# Patient Record
Sex: MALE | Race: WHITE | NOT HISPANIC OR LATINO
[De-identification: names, ages, dates, MRNs, and addresses within clinical notes are randomized per-mention and may not be internally consistent; named-entity substitution may affect disease eponyms.]

---

## 2021-11-03 PROBLEM — Z00.00 ENCOUNTER FOR PREVENTIVE HEALTH EXAMINATION: Status: ACTIVE | Noted: 2021-11-03

## 2021-11-04 ENCOUNTER — APPOINTMENT (OUTPATIENT)
Dept: ORTHOPEDIC SURGERY | Facility: CLINIC | Age: 21
End: 2021-11-04
Payer: COMMERCIAL

## 2021-11-04 VITALS — HEIGHT: 70 IN | WEIGHT: 155 LBS | BODY MASS INDEX: 22.19 KG/M2

## 2021-11-04 DIAGNOSIS — M25.561 PAIN IN RIGHT KNEE: ICD-10-CM

## 2021-11-04 PROCEDURE — 99204 OFFICE O/P NEW MOD 45 MIN: CPT

## 2021-11-04 PROCEDURE — 73562 X-RAY EXAM OF KNEE 3: CPT | Mod: RT

## 2021-11-04 NOTE — PHYSICAL EXAM
[de-identified] : Right knee\par \par Constitutional: \par The patient is healthy-appearing and in no apparent distress. \par \par Gait:\par The patient ambulates with a normal gait and no significant limp.\par \par Cardiovascular System: \par The capillary refill is less than 2 seconds. \par \par Skin: \par There are healed suprapatellar as well as lateral knee scars.  There is swelling and skin prominence at the tibial scar with mild 3x3 mm serous drainage.  \par There is marked quadriceps atrophy.\par \par Right Knee:\par  \par Bony Palpation: \par There is no tenderness of the medial joint line. \par There is no tenderness of the lateral joint line.\par There is no tenderness of the medial femoral chondyle.\par There is no tenderness of the lateral femoral chondyle.\par There is no tenderness of the tibial tubercle.\par There is no tenderness of the superior patella.\par There is no tenderness of the inferior patella.\par There is no tenderness of the medial patellar facet.\par There is no tenderness of the lateral patellar facet.\par \par Soft Tissue Palpation: \par There is no tenderness of the medial retinaculum.\par There is no tenderness of the lateral retinaculum.\par There is no tenderness of the quadriceps tendon.\par There is no tenderness of the patella tendon.\par There is no tenderness of the ITB.\par There is no tenderness of the pes anserine.\par \par Active Range of Motion: \par The range of motion at the knee actively and passively is 0 - 115. \par \par Special Tests: \par There is a negative Apley.\par There is a negative Steinmanns. \par There is a negative Lachman and Anterior Drawer.\par There is a negative Posterior Drawer.  \par There is no varus or valgus laxity.\par \par Strength: \par There is 5/5 hip flexion and 5/5 knee flexion and extension.  \par \par Psychiatric: \par The patient demonstrates a normal mood and affect and is active and alert\par \par  [de-identified] : Given patient's reported history and physical examination, x-ray evaluation ( as listed below ) was ordered and performed to aid in diagnosis and treatment of the patient.\par X-ray right knee.  Status post ACL reconstruction with a metallic femoral cortical button and a large tibial tunnel

## 2021-11-04 NOTE — HISTORY OF PRESENT ILLNESS
[de-identified] : Patient is a new patient presenting for evaluation in regards to right knee pain as well as a draining wound.  He reports having a total of 3 ACL reconstructions with the most recent being in July of 2021.  He states his first was at age 14 and then 2 years after that he underwent a revision or a tear this year in April and had surgery in July in his home town of [place].  He reports 4 weeks after having swelling and a draining wound which time he says he had 2 arthroscopic washouts and was on a IV antibiotics for 4 weeks.  He states he's been doing rather well but has had persistent mild clear drainage from the tibial wound which she reports they live close by wound packing.  He denies any fevers denies any instability he complains of pain and stiffness on flexion.  He reports he is going to physical therapy.  Patient states that his most recent surgery he also had IT band procedure in addition to his quad tendon harvest.  He reports that the infection was MSSA. \par \par \par Prior studies: N/A\par ACL surgery 7/1/21 ([place]) - staff infection 4-5 weeks post op

## 2021-11-04 NOTE — ASSESSMENT
[FreeTextEntry1] : Lengthy discussion with the patient regarding his reported history as well as current exam.  Discussed concerned over persistent drainage from the wound and at this time I would recommend laboratory infection markers to further evaluate.  CBC with differential ESR and CRP ordered and patient aware of any elevations concerns for possible persistent/recurrent infection.  The patient or as well given the prior infection concern for graft failure and patient this time elects to proceed with continued physical therapy.  Recommend followup in 3-4 weeks we will discuss once he has labs obtained

## 2021-11-10 ENCOUNTER — TRANSCRIPTION ENCOUNTER (OUTPATIENT)
Age: 21
End: 2021-11-10

## 2021-11-10 ENCOUNTER — APPOINTMENT (OUTPATIENT)
Dept: ORTHOPEDIC SURGERY | Facility: CLINIC | Age: 21
End: 2021-11-10
Payer: COMMERCIAL

## 2021-11-10 PROCEDURE — 20610 DRAIN/INJ JOINT/BURSA W/O US: CPT | Mod: RT

## 2021-11-10 PROCEDURE — 99213 OFFICE O/P EST LOW 20 MIN: CPT | Mod: 25

## 2021-11-11 ENCOUNTER — INPATIENT (INPATIENT)
Facility: HOSPITAL | Age: 21
LOS: 11 days | Discharge: HOME | DRG: 857 | End: 2021-11-23
Attending: SPECIALIST | Admitting: SPECIALIST
Payer: COMMERCIAL

## 2021-11-11 ENCOUNTER — APPOINTMENT (OUTPATIENT)
Dept: ORTHOPEDIC SURGERY | Facility: HOSPITAL | Age: 21
End: 2021-11-11
Payer: COMMERCIAL

## 2021-11-11 ENCOUNTER — RESULT REVIEW (OUTPATIENT)
Age: 21
End: 2021-11-11

## 2021-11-11 VITALS
RESPIRATION RATE: 18 BRPM | HEIGHT: 70 IN | DIASTOLIC BLOOD PRESSURE: 84 MMHG | TEMPERATURE: 98 F | HEART RATE: 92 BPM | SYSTOLIC BLOOD PRESSURE: 139 MMHG | OXYGEN SATURATION: 99 % | WEIGHT: 160.06 LBS

## 2021-11-11 DIAGNOSIS — B95.61 METHICILLIN SUSCEPTIBLE STAPHYLOCOCCUS AUREUS INFECTION AS THE CAUSE OF DISEASES CLASSIFIED ELSEWHERE: ICD-10-CM

## 2021-11-11 DIAGNOSIS — M00.061 STAPHYLOCOCCAL ARTHRITIS, RIGHT KNEE: ICD-10-CM

## 2021-11-11 DIAGNOSIS — M00.9 PYOGENIC ARTHRITIS, UNSPECIFIED: ICD-10-CM

## 2021-11-11 DIAGNOSIS — M86.8X6 OTHER OSTEOMYELITIS, LOWER LEG: ICD-10-CM

## 2021-11-11 DIAGNOSIS — M65.9 SYNOVITIS AND TENOSYNOVITIS, UNSPECIFIED: ICD-10-CM

## 2021-11-11 DIAGNOSIS — R21 RASH AND OTHER NONSPECIFIC SKIN ERUPTION: ICD-10-CM
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
## 2021-11-11 DIAGNOSIS — M25.161: ICD-10-CM

## 2021-11-11 DIAGNOSIS — T81.43XA INFECTION FOLLOWING A PROCEDURE, ORGAN AND SPACE SURGICAL SITE, INITIAL ENCOUNTER: ICD-10-CM

## 2021-11-11 DIAGNOSIS — T84.629A INFECTION AND INFLAMMATORY REACTION DUE TO INTERNAL FIXATION DEVICE OF UNSPECIFIED BONE OF LEG, INITIAL ENCOUNTER: ICD-10-CM
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
## 2021-11-11 LAB
ANION GAP SERPL CALC-SCNC: 12 MMOL/L — SIGNIFICANT CHANGE UP (ref 5–17)
APTT BLD: 36.6 SEC — HIGH (ref 27.5–35.5)
BASOPHILS # BLD AUTO: 0.03 K/UL — SIGNIFICANT CHANGE UP (ref 0–0.2)
BASOPHILS NFR BLD AUTO: 0.4 % — SIGNIFICANT CHANGE UP (ref 0–2)
BLD GP AB SCN SERPL QL: NEGATIVE — SIGNIFICANT CHANGE UP
BUN SERPL-MCNC: 11 MG/DL — SIGNIFICANT CHANGE UP (ref 7–23)
CALCIUM SERPL-MCNC: 9.9 MG/DL — SIGNIFICANT CHANGE UP (ref 8.4–10.5)
CHLORIDE SERPL-SCNC: 102 MMOL/L — SIGNIFICANT CHANGE UP (ref 96–108)
CO2 SERPL-SCNC: 27 MMOL/L — SIGNIFICANT CHANGE UP (ref 22–31)
CREAT SERPL-MCNC: 0.82 MG/DL — SIGNIFICANT CHANGE UP (ref 0.5–1.3)
EOSINOPHIL # BLD AUTO: 0.14 K/UL — SIGNIFICANT CHANGE UP (ref 0–0.5)
EOSINOPHIL NFR BLD AUTO: 2 % — SIGNIFICANT CHANGE UP (ref 0–6)
GLUCOSE SERPL-MCNC: 98 MG/DL — SIGNIFICANT CHANGE UP (ref 70–99)
GRAM STN FLD: SIGNIFICANT CHANGE UP
HCT VFR BLD CALC: 41.1 % — SIGNIFICANT CHANGE UP (ref 39–50)
HGB BLD-MCNC: 12.9 G/DL — LOW (ref 13–17)
IMM GRANULOCYTES NFR BLD AUTO: 0.4 % — SIGNIFICANT CHANGE UP (ref 0–1.5)
INR BLD: 1.14 — SIGNIFICANT CHANGE UP (ref 0.88–1.16)
LYMPHOCYTES # BLD AUTO: 1.36 K/UL — SIGNIFICANT CHANGE UP (ref 1–3.3)
LYMPHOCYTES # BLD AUTO: 19.5 % — SIGNIFICANT CHANGE UP (ref 13–44)
MCHC RBC-ENTMCNC: 27.7 PG — SIGNIFICANT CHANGE UP (ref 27–34)
MCHC RBC-ENTMCNC: 31.4 GM/DL — LOW (ref 32–36)
MCV RBC AUTO: 88.4 FL — SIGNIFICANT CHANGE UP (ref 80–100)
MONOCYTES # BLD AUTO: 0.48 K/UL — SIGNIFICANT CHANGE UP (ref 0–0.9)
MONOCYTES NFR BLD AUTO: 6.9 % — SIGNIFICANT CHANGE UP (ref 2–14)
NEUTROPHILS # BLD AUTO: 4.92 K/UL — SIGNIFICANT CHANGE UP (ref 1.8–7.4)
NEUTROPHILS NFR BLD AUTO: 70.8 % — SIGNIFICANT CHANGE UP (ref 43–77)
NRBC # BLD: 0 /100 WBCS — SIGNIFICANT CHANGE UP (ref 0–0)
PLATELET # BLD AUTO: 314 K/UL — SIGNIFICANT CHANGE UP (ref 150–400)
POTASSIUM SERPL-MCNC: 4.5 MMOL/L — SIGNIFICANT CHANGE UP (ref 3.5–5.3)
POTASSIUM SERPL-SCNC: 4.5 MMOL/L — SIGNIFICANT CHANGE UP (ref 3.5–5.3)
PROTHROM AB SERPL-ACNC: 13.6 SEC — SIGNIFICANT CHANGE UP (ref 10.6–13.6)
RBC # BLD: 4.65 M/UL — SIGNIFICANT CHANGE UP (ref 4.2–5.8)
RBC # FLD: 12.2 % — SIGNIFICANT CHANGE UP (ref 10.3–14.5)
RH IG SCN BLD-IMP: POSITIVE — SIGNIFICANT CHANGE UP
SARS-COV-2 RNA SPEC QL NAA+PROBE: NEGATIVE — SIGNIFICANT CHANGE UP
SODIUM SERPL-SCNC: 141 MMOL/L — SIGNIFICANT CHANGE UP (ref 135–145)
SPECIMEN SOURCE: SIGNIFICANT CHANGE UP
WBC # BLD: 6.96 K/UL — SIGNIFICANT CHANGE UP (ref 3.8–10.5)
WBC # FLD AUTO: 6.96 K/UL — SIGNIFICANT CHANGE UP (ref 3.8–10.5)

## 2021-11-11 PROCEDURE — 99285 EMERGENCY DEPT VISIT HI MDM: CPT

## 2021-11-11 PROCEDURE — 99284 EMERGENCY DEPT VISIT MOD MDM: CPT

## 2021-11-11 PROCEDURE — 71046 X-RAY EXAM CHEST 2 VIEWS: CPT | Mod: 26

## 2021-11-11 PROCEDURE — 88304 TISSUE EXAM BY PATHOLOGIST: CPT | Mod: 26

## 2021-11-11 PROCEDURE — ZZZZZ: CPT

## 2021-11-11 PROCEDURE — 11044 DBRDMT BONE 1ST 20 SQ CM/<: CPT | Mod: RT,59

## 2021-11-11 PROCEDURE — 27372 REMOVAL OF FOREIGN BODY: CPT | Mod: RT,59

## 2021-11-11 PROCEDURE — 29871 ARTHRS KNEE SURG FOR INFCTJ: CPT | Mod: RT

## 2021-11-11 RX ORDER — OXYCODONE HYDROCHLORIDE 5 MG/1
5 TABLET ORAL EVERY 4 HOURS
Refills: 0 | Status: DISCONTINUED | OUTPATIENT
Start: 2021-11-11 | End: 2021-11-18

## 2021-11-11 RX ORDER — OXYCODONE HYDROCHLORIDE 5 MG/1
10 TABLET ORAL EVERY 4 HOURS
Refills: 0 | Status: DISCONTINUED | OUTPATIENT
Start: 2021-11-11 | End: 2021-11-18

## 2021-11-11 RX ORDER — OXYCODONE HYDROCHLORIDE 5 MG/1
10 TABLET ORAL
Refills: 0 | Status: DISCONTINUED | OUTPATIENT
Start: 2021-11-11 | End: 2021-11-11

## 2021-11-11 RX ORDER — ONDANSETRON 8 MG/1
4 TABLET, FILM COATED ORAL EVERY 4 HOURS
Refills: 0 | Status: DISCONTINUED | OUTPATIENT
Start: 2021-11-11 | End: 2021-11-23

## 2021-11-11 RX ORDER — SENNA PLUS 8.6 MG/1
2 TABLET ORAL AT BEDTIME
Refills: 0 | Status: DISCONTINUED | OUTPATIENT
Start: 2021-11-11 | End: 2021-11-23

## 2021-11-11 RX ORDER — SODIUM CHLORIDE 9 MG/ML
1000 INJECTION, SOLUTION INTRAVENOUS
Refills: 0 | Status: DISCONTINUED | OUTPATIENT
Start: 2021-11-11 | End: 2021-11-18

## 2021-11-11 RX ORDER — CEFAZOLIN SODIUM 1 G
2000 VIAL (EA) INJECTION EVERY 8 HOURS
Refills: 0 | Status: DISCONTINUED | OUTPATIENT
Start: 2021-11-12 | End: 2021-11-12

## 2021-11-11 RX ORDER — MAGNESIUM HYDROXIDE 400 MG/1
30 TABLET, CHEWABLE ORAL DAILY
Refills: 0 | Status: DISCONTINUED | OUTPATIENT
Start: 2021-11-11 | End: 2021-11-23

## 2021-11-11 RX ORDER — FOLIC ACID 0.8 MG
1 TABLET ORAL DAILY
Refills: 0 | Status: DISCONTINUED | OUTPATIENT
Start: 2021-11-11 | End: 2021-11-23

## 2021-11-11 RX ORDER — POLYETHYLENE GLYCOL 3350 17 G/17G
17 POWDER, FOR SOLUTION ORAL AT BEDTIME
Refills: 0 | Status: DISCONTINUED | OUTPATIENT
Start: 2021-11-11 | End: 2021-11-23

## 2021-11-11 RX ORDER — ACETAMINOPHEN 500 MG
650 TABLET ORAL EVERY 6 HOURS
Refills: 0 | Status: DISCONTINUED | OUTPATIENT
Start: 2021-11-11 | End: 2021-11-23

## 2021-11-11 RX ORDER — PANTOPRAZOLE SODIUM 20 MG/1
40 TABLET, DELAYED RELEASE ORAL
Refills: 0 | Status: DISCONTINUED | OUTPATIENT
Start: 2021-11-11 | End: 2021-11-23

## 2021-11-11 RX ORDER — ACETAMINOPHEN 500 MG
1000 TABLET ORAL ONCE
Refills: 0 | Status: COMPLETED | OUTPATIENT
Start: 2021-11-11 | End: 2021-11-11

## 2021-11-11 RX ORDER — HYDROMORPHONE HYDROCHLORIDE 2 MG/ML
0.5 INJECTION INTRAMUSCULAR; INTRAVENOUS; SUBCUTANEOUS EVERY 4 HOURS
Refills: 0 | Status: DISCONTINUED | OUTPATIENT
Start: 2021-11-11 | End: 2021-11-18

## 2021-11-11 RX ORDER — HYDROMORPHONE HYDROCHLORIDE 2 MG/ML
0.5 INJECTION INTRAMUSCULAR; INTRAVENOUS; SUBCUTANEOUS
Refills: 0 | Status: DISCONTINUED | OUTPATIENT
Start: 2021-11-11 | End: 2021-11-18

## 2021-11-11 RX ADMIN — HYDROMORPHONE HYDROCHLORIDE 0.5 MILLIGRAM(S): 2 INJECTION INTRAMUSCULAR; INTRAVENOUS; SUBCUTANEOUS at 20:04

## 2021-11-11 RX ADMIN — SODIUM CHLORIDE 110 MILLILITER(S): 9 INJECTION, SOLUTION INTRAVENOUS at 20:22

## 2021-11-11 RX ADMIN — HYDROMORPHONE HYDROCHLORIDE 0.5 MILLIGRAM(S): 2 INJECTION INTRAMUSCULAR; INTRAVENOUS; SUBCUTANEOUS at 20:17

## 2021-11-11 RX ADMIN — SODIUM CHLORIDE 110 MILLILITER(S): 9 INJECTION, SOLUTION INTRAVENOUS at 21:29

## 2021-11-11 RX ADMIN — Medication 400 MILLIGRAM(S): at 20:25

## 2021-11-11 RX ADMIN — Medication 1000 MILLIGRAM(S): at 20:52

## 2021-11-11 NOTE — ED ADULT NURSE NOTE - OBJECTIVE STATEMENT
Patient w/ Hx of injury and surgeries to right knee, c/o of increased pain, swelling and discharge from right kneeX 2 weeks, states pus like discharge,  able to bear weight and ambulate w/out any difficulty. States has night time fevers, reaches 102o, on an antibiotic PO, unable to recall name.  Last took Ibuprofen at 9am today.  Dressing in place to right knee.  States scheduled for right knee surgery.

## 2021-11-11 NOTE — ED ADULT TRIAGE NOTE - CHIEF COMPLAINT QUOTE
Patient arrives ambulatory reporting he is having right knee surgery later today and was recommended to the ED by Dr. Dasilva.  Patient reports multiple R knee surgeries since July 2021, reporting swelling/drainage/pain to the site.

## 2021-11-11 NOTE — ASSESSMENT
[FreeTextEntry1] : Lengthy discussion with the patient given current exam and concern given now gross cartilage from the tibial site as well as knee swelling.  Discussed given his prior history that I think this is a chronic issue which is now re-flaring up but I would recommend as the antibiotics which were previously ordered obtaining blood work as well as urgent MRI evaluation.  Discussed with patient if he is unable to obtain the MRI which he is going to the facility after this appointment in an expeditious fashion consideration to arthroscopic washout and possible open irrigation and debridement although the benefit of having the MRI and a chronic case would help determine the need for tibial tunnel curettage and length of involvement if any osteomyelitis.  Risks and benefits detailed with the patient and he agrees with plan.  Discussed with patient high likelihood of infection for her ACL ligament reconstruction no longer to be viable\par

## 2021-11-11 NOTE — ED PROVIDER NOTE - NS ED ROS FT
Constl: +fevers, no fatigue  HENT: no URI symptoms  Neck: no pain  CV: no chest pain or leg swelling  Resp: no shortness of breath, cough  GI: no nausea, no vomiting, no abdominal pain, no diarrhea, no constipation, no blood in stool  : no urinary frequency, no urgency, no hematuria, no dysuria  MSK: no calf pain, +L knee pain and swelling  Skin: no rash, no skin break  Neuro: no focal weakness, no numbness, no headache, no vision changes    All other systems negative except as noted in HPI.

## 2021-11-11 NOTE — HISTORY OF PRESENT ILLNESS
[de-identified] : Patient is an established patient who was seen 6 days ago her initial consultation.  Patient was contacted today prior for an update in regards to his labs and patient states he is not going to receive them as yet and states he did notice some mild increased draining what physical therapy his own discussion recommendation for an MRI given the increased drainage.  He presents today as he states he had a temperature early this morning of 101.8 and increased swelling at the knee

## 2021-11-11 NOTE — ED PROVIDER NOTE - CLINICAL SUMMARY MEDICAL DECISION MAKING FREE TEXT BOX
young male with hx septic knee joint, here with return of sytmpoms: fever and drainage from wound. exam ssupicious for septic knee with possible cutaneous fistual. ortho plan to take pt to OR urgently and later pursue imaging for osteomyelitis if unclear based on washout. young male with hx septic knee joint, here with return of sytmpoms: fever and drainage from wound. exam ssupicious for septic knee with possible cutaneous fistual. ortho plan to take pt to OR urgently and later pursue imaging for osteomyelitis if unclear based on washout. patient declined pain medications

## 2021-11-11 NOTE — H&P ADULT - NSHPLABSRESULTS_GEN_ALL_CORE
Preop labs/EKG ordered     LABS:                        12.9   6.96  )-----------( 314      ( 11 Nov 2021 14:03 )             41.1     11 Nov 2021 14:03    141    |  102    |  11     ----------------------------<  98     4.5     |  27     |  0.82     Ca    9.9        11 Nov 2021 14:03      PT/INR - ( 11 Nov 2021 14:03 )   PT: 13.6 sec;   INR: 1.14          PTT - ( 11 Nov 2021 14:03 )  PTT:36.6 sec

## 2021-11-11 NOTE — H&P ADULT - PROBLEM SELECTOR PLAN 1
Discussed with Dr. Dasilva, will admit to orthopedic service Dr. Dasilva and plan for OR today for irrigation and debridement of right knee   -Surgical consent obtained  -NPO/IVF for OR today  -preop labs/EKG  -infectious disease consult postop   -pain control  -wbat right lower extremity   -patient understands and agrees with plan

## 2021-11-11 NOTE — PROGRESS NOTE ADULT - SUBJECTIVE AND OBJECTIVE BOX
Ortho Post Op Check    Procedure: I&D of R ACL reconstruction  Surgeon: Dr. JAIR Dasilva    Subjective:  Pain controlled with medication.  Denies CP, SOB, N/V, numbness/tingling.    Objective:  Vital Signs Last 24 Hrs  T(C): 37 (11-11-21 @ 19:15), Max: 37 (11-11-21 @ 19:15)  T(F): 98.6 (11-11-21 @ 19:15), Max: 98.6 (11-11-21 @ 19:15)  HR: 74 (11-11-21 @ 20:45) (74 - 124)  BP: 126/60 (11-11-21 @ 20:45) (126/60 - 155/86)  BP(mean): 86 (11-11-21 @ 20:45) (86 - 116)  RR: 16 (11-11-21 @ 20:45) (16 - 20)  SpO2: 98% (11-11-21 @ 20:45) (97% - 100%)  AVSS    General: Pt Alert and oriented, NAD  RLE:  Dressing C/D/I  Motor: 5/5 EHL/FHL/TA/GS b/l  Sensation: SILT throughout all nerve distributions  Pulses: Toes WWP    Post-op X-Ray: s/p TKA. Components well-fixed in proper alignment.    A/P: 21yMale s/p I&D of R ACL reconstruction on 11-11.  - Stable  - Pain Control  - DVT ppx: SCDs, ASA 325mg bid  - Post op abx: Ancef 2g q8h standing  - Resume home meds  - PT, WBS: WBAT  - ID consult in AM    Ortho Pager [phone]

## 2021-11-11 NOTE — ED PROVIDER NOTE - PHYSICAL EXAMINATION
Gen: well-developed young male, nontoxic, in nad. breathing comfortably on room air  HENT: head NCAT. no conjunctival pallor, mmm  Neck: FROM, supple  CV: regular rate, no m/r/g  Resp: good air movement, bs equal and clear laura  GI: abd soft ntnd  MSK: ROM of right knee intact. has knee effusion, indurated, warmt and mildly erythematous. has wound inferior to knee/ superior aspect of tibia with surroudning granulation tissue and purulent drainge. no sig ttp.  Skin: no rash, warm extremities, normal cap refill  Neuro: alert, answering questions appropriately. cranial nerves, strength, sensation and coordination grossly intact  Psych: appropriate mood and thought content

## 2021-11-11 NOTE — H&P ADULT - NSHPPHYSICALEXAM_GEN_ALL_CORE
Gen: NAD sitting comfortably on chair in the ED  MSK: Skin warm and well perfused. DP pulse palpable right lower extremity. Has bandage overlying right knee wound - not taken down 2/2 patient going to OR today for right knee washout. Sensation intact to right lower extremity although has diminished sensation to right toes compared to left toes which pt states is his baseline after his last surgery. Firing EHL/FHL/TA/GS right lower extremity.

## 2021-11-11 NOTE — ED PROVIDER NOTE - OBJECTIVE STATEMENT
22 yo M sent to ED by orthopedist Dr Ervin for admission for washout of septic knee. [place] complicated by septic arthritis requiring 2 washouts and iv abx [place] picc [place] unsure if he has had osteomyelitis. has transferred care to [facility] when moved to [facility]. seen by dr ervin yesterday, joint aspiration attempted without any output. pt has had 2d of fever Tm 101.8, knee pain and drainage from wound at inferior aspect of knee. pain with bearing weight only 22 yo M sent to ED by orthopedist Dr Ervin for admission for washout of septic knee. pt [place] ACL [place], complicated by septic arthritis requiring 2 washouts and iv abx via picc line ([place] unsure if he has had osteomyelitis. has transferred care to [facility] when moved to [facility]. seen by dr ervin yesterday, joint aspiration attempted without any output. pt has had 2d of fever Tm 101.8, knee pain and drainage from wound at inferior aspect of knee. pain with bearing weight only. started keflex yesterday

## 2021-11-11 NOTE — PROCEDURE
[de-identified] : Patient has demonstrated limited relief from NSAIDS, rest, exercises / PT, and after discussion of the risks and benefits, the patient has elected to proceed with an aspiration of the RIGHT knee via an Superolateral site.\par Confirmed that the patient does not have history of prior adverse reactions, active, infections, or relevant allergies.   There was no erythema or warmth, and the skin was clear.  The skin was sterilized with alcohol and via sterile technique, only 3 cc of sanginous fluid was removed.  The aspiration was completed without complication and a bandage was applied.  The patient tolerated the procedure well and was given post-injection instructions.

## 2021-11-11 NOTE — ED ADULT NURSE NOTE - NSIMPLEMENTINTERV_GEN_ALL_ED
Implemented All Universal Safety Interventions:  [place] to call system. Call bell, personal items and telephone within reach. Instruct patient to call for assistance. Room bathroom lighting operational. Non-slip footwear when patient is off stretcher. Physically safe environment: no spills, clutter or unnecessary equipment. Stretcher in lowest position, wheels locked, appropriate side rails in place.

## 2021-11-11 NOTE — ED ADULT NURSE REASSESSMENT NOTE - NS ED NURSE REASSESS COMMENT FT1
Patient a/oX3, c/o of right knee pain, redness, swelling and pus discharge, no fever.  NSR on EKG, vital signs stable.  Left AC PIV #20 in place, all labs sent, no complications.  For surgery/Ortho admit.  NPO observed. Endorsement report and care received by TERRY Barton / ED holding pending OR.

## 2021-11-11 NOTE — PHYSICAL EXAM
[de-identified] : Right knee\par \par Constitutional: \par The patient is healthy-appearing and in no apparent distress. \par \par Gait:\par The patient ambulates with a normal gait and no significant limp.\par \par Cardiovascular System: \par The capillary refill is less than 2 seconds. \par \par Skin: \par There are healed suprapatellar as well as lateral knee scars.  There is swelling and skin prominence at the tibial scar with grossly expressible puruluence.  There is a moderate knee effusion but no erythema.  \par There is marked quadriceps atrophy.\par \par Right Knee:\par  \par Bony Palpation: \par There is no tenderness of the medial joint line. \par There is no tenderness of the lateral joint line.\par There is no tenderness of the medial femoral chondyle.\par There is no tenderness of the lateral femoral chondyle.\par There is no tenderness of the tibial tubercle.\par There is no tenderness of the superior patella.\par There is no tenderness of the inferior patella.\par There is no tenderness of the medial patellar facet.\par There is no tenderness of the lateral patellar facet.\par \par Soft Tissue Palpation: \par There is no tenderness of the medial retinaculum.\par There is no tenderness of the lateral retinaculum.\par There is no tenderness of the quadriceps tendon.\par There is no tenderness of the patella tendon.\par There is no tenderness of the ITB.\par There is no tenderness of the pes anserine.\par \par Active Range of Motion: \par The range of motion at the knee actively and passively is 0 - 100. \par \par Psychiatric: \par The patient demonstrates a normal mood and affect and is active and alert\par \par

## 2021-11-11 NOTE — H&P ADULT - HISTORY OF PRESENT ILLNESS
The patient is a 21 year old male s/p right ACL reconstruction in 2014 with subsequent revisions and recent infection s/p right knee washout in july 2021 sent to the ED by Dr. Dasilva with concern for continued infection. The patient states he had a right knee washout in july 2021 and was subsequently on IV antibiotics for 4 weeks. He states that he recently noticed some drainage from his right knee wound which worsened over the last 2 days. He saw Dr. Dasilva in his outpatient office who advised he come to the ED for further evaluation. Took a dose of PO keflex yesterday as prescribed by Dr. Dasilva.

## 2021-11-12 ENCOUNTER — TRANSCRIPTION ENCOUNTER (OUTPATIENT)
Age: 21
End: 2021-11-12

## 2021-11-12 LAB
ANION GAP SERPL CALC-SCNC: 11 MMOL/L — SIGNIFICANT CHANGE UP (ref 5–17)
BASOPHILS # BLD AUTO: 0.01 K/UL — SIGNIFICANT CHANGE UP (ref 0–0.2)
BASOPHILS NFR BLD AUTO: 0.2 % — SIGNIFICANT CHANGE UP (ref 0–2)
BUN SERPL-MCNC: 12 MG/DL — SIGNIFICANT CHANGE UP (ref 7–23)
CALCIUM SERPL-MCNC: 9.2 MG/DL — SIGNIFICANT CHANGE UP (ref 8.4–10.5)
CHLORIDE SERPL-SCNC: 100 MMOL/L — SIGNIFICANT CHANGE UP (ref 96–108)
CO2 SERPL-SCNC: 27 MMOL/L — SIGNIFICANT CHANGE UP (ref 22–31)
COVID-19 SPIKE DOMAIN AB INTERP: POSITIVE
COVID-19 SPIKE DOMAIN ANTIBODY RESULT: >250 U/ML — HIGH
CREAT SERPL-MCNC: 0.79 MG/DL — SIGNIFICANT CHANGE UP (ref 0.5–1.3)
EOSINOPHIL # BLD AUTO: 0 K/UL — SIGNIFICANT CHANGE UP (ref 0–0.5)
EOSINOPHIL NFR BLD AUTO: 0 % — SIGNIFICANT CHANGE UP (ref 0–6)
GLUCOSE SERPL-MCNC: 154 MG/DL — HIGH (ref 70–99)
HCT VFR BLD CALC: 35.1 % — LOW (ref 39–50)
HGB BLD-MCNC: 11.2 G/DL — LOW (ref 13–17)
IMM GRANULOCYTES NFR BLD AUTO: 0.3 % — SIGNIFICANT CHANGE UP (ref 0–1.5)
LYMPHOCYTES # BLD AUTO: 0.65 K/UL — LOW (ref 1–3.3)
LYMPHOCYTES # BLD AUTO: 11.2 % — LOW (ref 13–44)
MCHC RBC-ENTMCNC: 28.1 PG — SIGNIFICANT CHANGE UP (ref 27–34)
MCHC RBC-ENTMCNC: 31.9 GM/DL — LOW (ref 32–36)
MCV RBC AUTO: 88.2 FL — SIGNIFICANT CHANGE UP (ref 80–100)
MONOCYTES # BLD AUTO: 0.29 K/UL — SIGNIFICANT CHANGE UP (ref 0–0.9)
MONOCYTES NFR BLD AUTO: 5 % — SIGNIFICANT CHANGE UP (ref 2–14)
NEUTROPHILS # BLD AUTO: 4.82 K/UL — SIGNIFICANT CHANGE UP (ref 1.8–7.4)
NEUTROPHILS NFR BLD AUTO: 83.3 % — HIGH (ref 43–77)
NIGHT BLUE STAIN TISS: SIGNIFICANT CHANGE UP
NRBC # BLD: 0 /100 WBCS — SIGNIFICANT CHANGE UP (ref 0–0)
PLATELET # BLD AUTO: 331 K/UL — SIGNIFICANT CHANGE UP (ref 150–400)
POTASSIUM SERPL-MCNC: 4.5 MMOL/L — SIGNIFICANT CHANGE UP (ref 3.5–5.3)
POTASSIUM SERPL-SCNC: 4.5 MMOL/L — SIGNIFICANT CHANGE UP (ref 3.5–5.3)
RBC # BLD: 3.98 M/UL — LOW (ref 4.2–5.8)
RBC # FLD: 11.9 % — SIGNIFICANT CHANGE UP (ref 10.3–14.5)
SARS-COV-2 IGG+IGM SERPL QL IA: >250 U/ML — HIGH
SARS-COV-2 IGG+IGM SERPL QL IA: POSITIVE
SODIUM SERPL-SCNC: 138 MMOL/L — SIGNIFICANT CHANGE UP (ref 135–145)
SPECIMEN SOURCE: SIGNIFICANT CHANGE UP
WBC # BLD: 5.79 K/UL — SIGNIFICANT CHANGE UP (ref 3.8–10.5)
WBC # FLD AUTO: 5.79 K/UL — SIGNIFICANT CHANGE UP (ref 3.8–10.5)

## 2021-11-12 PROCEDURE — 99222 1ST HOSP IP/OBS MODERATE 55: CPT

## 2021-11-12 RX ORDER — ASPIRIN/CALCIUM CARB/MAGNESIUM 324 MG
325 TABLET ORAL
Refills: 0 | Status: DISCONTINUED | OUTPATIENT
Start: 2021-11-12 | End: 2021-11-14

## 2021-11-12 RX ORDER — VANCOMYCIN HCL 1 G
1000 VIAL (EA) INTRAVENOUS EVERY 8 HOURS
Refills: 0 | Status: DISCONTINUED | OUTPATIENT
Start: 2021-11-12 | End: 2021-11-13

## 2021-11-12 RX ORDER — PIPERACILLIN AND TAZOBACTAM 4; .5 G/20ML; G/20ML
4.5 INJECTION, POWDER, LYOPHILIZED, FOR SOLUTION INTRAVENOUS EVERY 6 HOURS
Refills: 0 | Status: DISCONTINUED | OUTPATIENT
Start: 2021-11-12 | End: 2021-11-12

## 2021-11-12 RX ADMIN — Medication 325 MILLIGRAM(S): at 17:22

## 2021-11-12 RX ADMIN — Medication 1 MILLIGRAM(S): at 11:01

## 2021-11-12 RX ADMIN — SENNA PLUS 2 TABLET(S): 8.6 TABLET ORAL at 22:32

## 2021-11-12 RX ADMIN — Medication 650 MILLIGRAM(S): at 02:31

## 2021-11-12 RX ADMIN — Medication 1 TABLET(S): at 11:02

## 2021-11-12 RX ADMIN — POLYETHYLENE GLYCOL 3350 17 GRAM(S): 17 POWDER, FOR SOLUTION ORAL at 22:33

## 2021-11-12 RX ADMIN — Medication 650 MILLIGRAM(S): at 09:24

## 2021-11-12 RX ADMIN — PIPERACILLIN AND TAZOBACTAM 200 GRAM(S): 4; .5 INJECTION, POWDER, LYOPHILIZED, FOR SOLUTION INTRAVENOUS at 11:02

## 2021-11-12 RX ADMIN — SODIUM CHLORIDE 110 MILLILITER(S): 9 INJECTION, SOLUTION INTRAVENOUS at 19:15

## 2021-11-12 RX ADMIN — OXYCODONE HYDROCHLORIDE 5 MILLIGRAM(S): 5 TABLET ORAL at 19:15

## 2021-11-12 RX ADMIN — Medication 650 MILLIGRAM(S): at 09:54

## 2021-11-12 RX ADMIN — Medication 650 MILLIGRAM(S): at 18:44

## 2021-11-12 RX ADMIN — Medication 100 MILLIGRAM(S): at 02:24

## 2021-11-12 RX ADMIN — Medication 250 MILLIGRAM(S): at 17:22

## 2021-11-12 RX ADMIN — OXYCODONE HYDROCHLORIDE 5 MILLIGRAM(S): 5 TABLET ORAL at 19:45

## 2021-11-12 RX ADMIN — Medication 650 MILLIGRAM(S): at 18:29

## 2021-11-12 RX ADMIN — Medication 250 MILLIGRAM(S): at 09:24

## 2021-11-12 NOTE — DISCHARGE NOTE PROVIDER - HOSPITAL COURSE
Admitted: 11/11/21  Surgery: 11/11/21; I&D right knee ACL  Linda-op Antibiotics: Vancy and Zosyn  Pain control  DVT prophylaxis: Aspirin 325mg BID  OOB/Physical Therapy  Infectious disease consulted  Plastic surgery consulted   Admitted: 11/11/21  Surgery: 11/11/21 I&D right knee, 11/15/21 repeat I&D right knee  IV antibiotics  Pain control  DVT prophylaxis  OOB/Physical Therapy  Infectious disease consulted  Plastic surgery consulted   Admitted: 11/11/21  Surgery: 11/11/21 I&D right knee,   11/15/21 repeat I&D right knee  11/18/21: repeat I&D right knee with gastrocnemius muscle flap and skin graft closure   IV antibiotics  Pain control  DVT prophylaxis  OOB/Physical Therapy  Infectious disease consulted  Plastic surgery consulted   Admitted: 11/11/21  Surgery: 11/11/21 I&D right knee,   11/15/21 repeat I&D right knee  11/18/21: repeat I&D right knee with gastrocnemius muscle flap and skin graft closure   IV antibiotics-   Pain control  DVT prophylaxis  OOB/Physical Therapy  Infectious disease consulted  Plastic surgery consulted

## 2021-11-12 NOTE — PROGRESS NOTE ADULT - SUBJECTIVE AND OBJECTIVE BOX
Orthopaedic Surgery Progress Note    Post-operative day #1 s/p right knee irrigation and debridement     Subjective:     Patient seen and examined. Patient comfortable without complaints, pain controlled.      Objective:    Vital Signs Last 24 Hrs  T(C): 36.8 (11-12-21 @ 08:43), Max: 36.8 (11-12-21 @ 08:43)  T(F): 98.2 (11-12-21 @ 08:43), Max: 98.2 (11-12-21 @ 08:43)  HR: 91 (11-12-21 @ 08:43) (91 - 91)  BP: 112/69 (11-12-21 @ 08:43) (112/69 - 112/69)  BP(mean): --  RR: 17 (11-12-21 @ 08:43) (17 - 17)  SpO2: 98% (11-12-21 @ 08:43) (98% - 98%)  AVSS    PE:  General: Patient alert and oriented, NAD  Dressing: Clean/dry/intact right knee ace wrap  sensation intact to right lower extremity   Motor: EHL/FHL/TA/GS firing right lower extremity                           11.2   5.79  )-----------( 331      ( 12 Nov 2021 06:49 )             35.1   11-12    138  |  100  |  12  ----------------------------<  154<H>  4.5   |  27  |  0.79    Ca    9.2      12 Nov 2021 06:49        A/P: 21yMale POD#1 s/p right knee irrigation and debridement   1. Pain control as needed  2. DVT prophylaxis: ASA  3. PT, weight-bearing status: WBAT   4. ID consulted - per Dr. Herminio littlejohn/jeremiah cantu, will appreciate further ID recommendations  5. Per Dr. Dasilva, MRI right knee w/ extension to proximal tibial shaft w/ and w/o IV contrast ordered to eval for osteomyelitis   6. plastic surgeon Dr. Ceballos to see patient today     Ortho Pager [phone]

## 2021-11-12 NOTE — DIETITIAN INITIAL EVALUATION ADULT. - FACTORS AFF FOOD INTAKE
· Patient came to the hospital with shortness of breath that has been going on for the past 3 4 days  Patient with chronic bilateral lower extremity swelling which is slightly worse  · Currently patient requiring oxygen which is unusual for him  · Chest x-ray reviewed  Patient do not have any fever  Cough with expectoration present  Unlikely this is pneumonia given the duration of symptoms     No significant leukocytosis noted  · COVID negative  · Will obtain a CT scan to evaluate none

## 2021-11-12 NOTE — DIETITIAN INITIAL EVALUATION ADULT. - ORAL INTAKE PTA/DIET HISTORY
Spoke with pt in room this morning. States appetite over the summer was poor and reports ~30# wt loss, however, over last 2-3 months appetite has returned to "normal." Eating 3 meals + snacks per day. No cultural, ethnic, Sikhism food preferences noted, NKFA

## 2021-11-12 NOTE — DISCHARGE NOTE PROVIDER - PROVIDER TOKENS
PROVIDER:[TOKEN:[[id number]],FOLLOWUP:[2 weeks]],PROVIDER:[TOKEN:[[id number]],FOLLOWUP:[2 weeks]]

## 2021-11-12 NOTE — PHYSICAL THERAPY INITIAL EVALUATION ADULT - ADDITIONAL COMMENTS
Pt lives with friends in an apartment, no BRISEIDA + 2FOS inside.  Pt owns AC.  Pt denies recent Assistive Device use prior to I&D.  Pt denies recent history of falls.

## 2021-11-12 NOTE — DIETITIAN INITIAL EVALUATION ADULT. - OTHER INFO
Pt 22 y/o male s/p rt ACL reconstruction in 2014 with subsequent revisions and recent infection s/p rt knee washout in July 2021 and on IV abx x4 weeks. Recently noticed drainage from rt knee which worsened over the last 2 days. Now s/p I&D of rt knee joint, removal of foreign body today (11/12). Discussed with pt importance of nutrition for healing and recovery. Is agreeable to tray Ensure Max and LPS daily. Spoke with ortho team and ordered supplements.     GI: abd-soft, + normal bowel sounds  Skin: surgical incision noted, no pressure breakdown   Pain: Denies     Pt states UBW early this year was 170# (77.3kg) and reports ~30# (13.6kg) wt decline over the summer. However, states weight stabilized over last 2-3 months around 155-160# (70.5-72.7kg). Per EMR, no wt hx. Per pt's reported UBW and current admission weight, no significant wt loss is noted. Difficult to determine actual weight decline.

## 2021-11-12 NOTE — DISCHARGE NOTE PROVIDER - NSDCFUADDINST_GEN_ALL_CORE_FT
You have been seen by Infectious Disease during your admission. It is very important that you follow-up with Dr. Durham following discharge. Please call the number listed above.     ACTIVITY:  - Weightbear as tolerated with assistive device, if needed. No strenuous activity, heavy lifting, driving or returning to work until cleared by MD.  - You may experience postoperative swelling on the operative extremity. You may ice the surgery site for 20 minute intervals and elevate the operative extremity at the level of the ankle to help reduce swelling.    DRESSING: ***  - You may shower, your dressing is water-resistant. Do not soak in bathtubs. Remove dressing after postop day 5-7, then leave incision open to air.  - Keep your incision clean and dry. Do not pick at your incision. Do not apply creams, ointments or oils to your incision until cleared by your surgeon. Do not soak your incision in sitting water (ie tubs, pools, lakes, etc.) until cleared by your surgeon. You may let clean, running water fall over your incision.    MEDICATION/ANTICOAGULATION:  -You have been prescribed Aspirin 325 mg, twice daily, as a preventative to help prevent postoperative blood clots. Please take this medication as prescribed.   - You have been prescribed medications for pain:    - Tylenol for mild to moderate pain. If you have been prescribed 650mg, you may take this medication every 6 hours. If you have been prescribed 975mg or 1,000mg, you may take this every 8 hours.     - For more severe pain, you may continue to take the Tylenol with the addition of narcotic pain medication. You may take this medication every 4 hours. Please take this medication as prescribed. Do not take more than prescribed. Note that this medication may cause drowsiness or dizziness. Do not operate machinery. This medication may cause constipation.  - If you have had a joint replacement and do not have a history of kidney disease or stomach bleed/ulcer, you may take Celebrex, Naproxen, or Ibuprofen to help with postoperative pain and inflammation. These medications are considered anti-inflammatories. Please do not combine anti-inflammatories.   -Try to have regular bowel movements. Take stool softener or laxative if necessary. You may wish to take Miralax daily until you have regular bowel movements.   -If you have been prescribed Aspirin or an anti-inflammatory, please take Prilosec once a day, before breakfast, until no longer taking Aspirin or anti-inflammatory. This will help protect your stomach lining.  - If you have a pain management physician, please follow-up with them postoperatively.   - If you experience any negative side effects of your medications, please call your surgeon's office to discuss.    Follow-up:  - Call to schedule an appt with Dr. Dasilva for follow up. If you have staples or sutures they will be removed in office.  - Please follow-up with your primary care physician or any other specialist you see postoperatively, if needed.     - Contact your doctor if you experience: fever greater than 101.5, chills, chest pain, difficulty breathing, redness or excessive drainage around the incision, other concerns.   You have been seen by Infectious Disease during your admission. It is very important that you follow-up with Dr. Durham following discharge. Please call the number listed above.   IV antibiotic via PICC:     ACTIVITY:  - Weight bear as tolerated with assistive device, if needed. No strenuous activity, heavy lifting, driving or returning to work until cleared by MD.  - You may experience postoperative swelling on the operative extremity. You may ice the surgery site for 20 minute intervals and elevate the operative extremity at the level of the ankle to help reduce swelling.    DRESSING: ***  - You may shower, your dressing is water-resistant. Do not soak in bathtubs. Remove dressing after postop day 5-7, then leave incision open to air.  - Keep your incision clean and dry. Do not pick at your incision. Do not apply creams, ointments or oils to your incision until cleared by your surgeon. Do not soak your incision in sitting water (ie tubs, pools, lakes, etc.) until cleared by your surgeon. You may let clean, running water fall over your incision.    MEDICATION/ANTICOAGULATION:  -You have been prescribed Aspirin 325 mg, twice daily, as a preventative to help prevent postoperative blood clots. Please take this medication as prescribed.   - You have been prescribed medications for pain:    - Tylenol for mild to moderate pain. If you have been prescribed 650mg, you may take this medication every 6 hours. If you have been prescribed 975mg or 1,000mg, you may take this every 8 hours.     - For more severe pain, you may continue to take the Tylenol with the addition of narcotic pain medication. You may take this medication every 4 hours. Please take this medication as prescribed. Do not take more than prescribed. Note that this medication may cause drowsiness or dizziness. Do not operate machinery. This medication may cause constipation.  -Try to have regular bowel movements. Take stool softener or laxative if necessary. You may wish to take Miralax daily until you have regular bowel movements.   -If you have been prescribed Aspirin or an anti-inflammatory, please take Prilosec once a day, before breakfast, until no longer taking Aspirin or anti-inflammatory. This will help protect your stomach lining.  - If you have a pain management physician, please follow-up with them postoperatively.   - If you experience any negative side effects of your medications, please call your surgeon's office to discuss.    Follow-up:  - Call to schedule an appt with Dr. Dasilav for follow up. If you have staples or sutures they will be removed in office.  - Please follow-up with your primary care physician or any other specialist you see postoperatively, if needed.     - Contact your doctor if you experience: fever greater than 101.5, chills, chest pain, difficulty breathing, redness or excessive drainage around the incision, other concerns.   You have been seen by Infectious Disease during your admission. It is very important that you follow-up with Dr. Durham following discharge. Please call the number listed above.   IV antibiotic via PICC:     ACTIVITY:  - Nonweight bearing to RLE in knee immobilizer for next 2 weeks. No strenuous activity, heavy lifting, driving or returning to work until cleared by MD.  - You may experience postoperative swelling on the operative extremity. You may ice the surgery site for 20 minute intervals and elevate the operative extremity at the level of the ankle to help reduce swelling.    DRESSING:   - Do not shower until cleared by Dr. Ceballos. You may sponge bathe.  Do not soak in bathtubs. Twice daily dressing changes with bacitracin, xeroform, gauze and tegaderm.   - Keep your incision clean and dry. Do not pick at your incision. Do not apply creams, ointments or oils to your incision until cleared by your surgeon. Do not soak your incision in sitting water (ie tubs, pools, lakes, etc.) until cleared by your surgeon.   - Your drain will remain in place until you see Dr. Ceballos in the office. Your VAC has been removed and replaced with above dressing. Continue knee immobilizer.   - Please call Dr. Ceballos for any flap/dressing concerns.   MEDICATION/ANTICOAGULATION:  -You have been prescribed Lovenox injections daily for 14 days as a preventative to help prevent postoperative blood clots. Please take this medication as prescribed.   - You have been prescribed medications for pain:    - Tylenol for mild to moderate pain. If you have been prescribed 650mg, you may take this medication every 6 hours. If you have been prescribed 975mg or 1,000mg, you may take this every 8 hours.     - For more severe pain, you may continue to take the Tylenol with the addition of narcotic pain medication. You may take this medication every 4 hours. Please take this medication as prescribed. Do not take more than prescribed. Note that this medication may cause drowsiness or dizziness. Do not operate machinery. This medication may cause constipation.  -Try to have regular bowel movements. Take stool softener or laxative if necessary. You may wish to take Miralax daily until you have regular bowel movements.   -If you have been prescribed an anti-inflammatory, please take Prilosec once a day, before breakfast, until no longer taking Aspirin or anti-inflammatory. This will help protect your stomach lining.  - If you have a pain management physician, please follow-up with them postoperatively.   - If you experience any negative side effects of your medications, please call your surgeon's office to discuss.    Follow-up:  - Call Dr. Ceballos to schedule appt for next week for dressing/flap check and for drain removal.   - Call to schedule an appt with Dr. Dasilva for follow up. If you have staples or sutures they will be removed in office.  - Please follow up with ID   - Please follow-up with your primary care physician or any other specialist you see postoperatively, if needed.     - Contact your doctor if you experience: fever greater than 101.5, chills, chest pain, difficulty breathing, redness or excessive drainage around the incision, other concerns.   You have been seen by Infectious Disease during your admission. It is very important that you follow-up with Dr. Durham following discharge.   Please call the number listed above.   IV antibiotic via PICC: cefazolin (ancef) 2g q8 hours until 12/29/21    ACTIVITY:  - Nonweight bearing to RLE in knee immobilizer for next 2 weeks. No strenuous activity, heavy lifting, driving or returning to work until cleared by MD.  - You may experience postoperative swelling on the operative extremity. You may ice the surgery site for 20 minute intervals and elevate the operative extremity at the level of the ankle to help reduce swelling.    DRESSING:   - Do not shower until cleared by Dr. Ceballos. You may sponge bathe.  Do not soak in bathtubs.   Twice daily dressing changes with bacitracin, xeroform, gauze and tegaderm.   - Keep your incision clean and dry. Do not pick at your incision. Do not apply creams, ointments or oils to your incision until cleared by your surgeon. Do not soak your incision in sitting water (ie tubs, pools, lakes, etc.) until cleared by your surgeon.   - Your drain will remain in place until you see Dr. Ceballos in the office. Your VAC has been removed and replaced with above dressing. Continue knee immobilizer.   - Please call Dr. Ceballos for any flap/dressing concerns.     MEDICATION/ANTICOAGULATION:  -You have been prescribed Lovenox injections daily for 14 days as a preventative to help prevent postoperative blood clots. Please take this medication as prescribed.   - You have been prescribed medications for pain:    - Tylenol for mild to moderate pain. If you have been prescribed 650mg, you may take this medication every 6 hours. If you have been prescribed 975mg or 1,000mg, you may take this every 8 hours.     - For more severe pain, you may continue to take the Tylenol with the addition of narcotic pain medication. You may take this medication every 4-6 hours. Please take this medication as prescribed. Do not take more than prescribed. Note that this medication may cause drowsiness or dizziness. Do not operate machinery. This medication may cause constipation.  -Try to have regular bowel movements. Take stool softener or laxative if necessary. You may wish to take Miralax daily until you have regular bowel movements.   - If you have a pain management physician, please follow-up with them postoperatively.   - If you experience any negative side effects of your medications, please call your surgeon's office to discuss.    Follow-up:  - Call Dr. Ceballos to schedule appt for next week for dressing/flap check and for drain removal. - planned for Monday 11/29 or after   - Call to schedule an appt with Dr. Dasilva for follow up. If you have staples or sutures they will be removed in office.  - Please follow up with ID: Follow up with Dr. Durham within 2 weeks of Discharge. ([address] [place] [address], [phone]), ID office will call you to schedule   - Please follow-up with your primary care physician or any other specialist you see postoperatively, if needed.     - Contact your doctor if you experience: fever greater than 101.5, chills, chest pain, difficulty breathing, redness or excessive drainage around the incision, other concerns.

## 2021-11-12 NOTE — DISCHARGE NOTE PROVIDER - NSDCMRMEDTOKEN_GEN_ALL_CORE_FT
ceFAZolin: 2 gram(s) intravenous every 8 hours for 6 weeks till 12/ /21           MDD:2g  senna oral tablet: 2 tab(s) orally once a day (at bedtime)   CBC, CMP, ESR, CRP lab draw: 1x/week for 6 weeks from 11/18-12/29/21  Please fax results to Dr. Durham [phone]  ceFAZolin: 2 gram(s) intravenous every 8 hours  from 11/18-12/29 for 6 weeks MDD:2  HEPARIN 3ML FROM 5ML SYRINGE: ADMINISTER AFTER EACH INFUSION  NS FLUSH 10ML: ADMINISTER AFTER EACH INFUSION  senna oral tablet: 2 tab(s) orally once a day (at bedtime)   CBC, CMP, ESR, CRP lab draw: 1x/week for 6 weeks from 11/18-12/29/21  Please fax results to Dr. Durham [phone]  ceFAZolin: 2 gram(s) intravenous every 8 hours  from 11/18-12/29 for 6 weeks MDD:2  enoxaparin 40 mg/0.4 mL injectable solution: 40 milligram(s) subcutaneously once a day for 14 days for DVT prophylaxis    HEPARIN 3ML FROM 5ML SYRINGE: ADMINISTER AFTER EACH INFUSION  NS FLUSH 10ML: ADMINISTER AFTER EACH INFUSION  senna oral tablet: 2 tab(s) orally once a day (at bedtime)   acetaminophen 325 mg oral tablet: 2 tab(s) orally every 6 hours, As needed, Temp greater or equal to 38C (100.4F), Mild Pain (1 - 3)  bacitracin 500 units/g topical ointment: Apply topically to wound once a day   CBC, CMP, ESR, CRP lab draw: 1x/week for 6 weeks from 11/18-12/29/21  Please fax results to Dr. Durham [phone]  ceFAZolin: 2 gram(s) intravenous every 8 hours  from 11/18-12/29 for 6 weeks MDD:2  enoxaparin 40 mg/0.4 mL injectable solution: 40 milligram(s) subcutaneously once a day for 14 days for DVT prophylaxis    HEPARIN 3ML FROM 5ML SYRINGE: ADMINISTER AFTER EACH INFUSION  NS FLUSH 10ML: ADMINISTER AFTER EACH INFUSION  oxyCODONE 5 mg oral tablet: 1-2 tab(s) orally every 6 hours, As Needed moderate to severe pain MDD:4  senna oral tablet: 2 tab(s) orally once a day (at bedtime)

## 2021-11-12 NOTE — CONSULT NOTE ADULT - SUBJECTIVE AND OBJECTIVE BOX
INFECTIOUS DISEASES INITIAL CONSULT NOTE    HPI: 21M h/o R ACL reconstruction in 2014, revision in 2016 and 7/1/21 c/b MSSA infection s/p I&D x 2 in 7/2021 s/p cefazolin x 4 weeks (ended in mid 9/2021) p/w recurrent R knee infection.  He had all three surgeries at [facility].  After the 3rd surgery on 7/1/21, he developed R knee swelling/erythema and was found to have MSSA infection.  He underwent I&D twice with dissolvable screw placeent  and received cefazolin 4 weeks.   His wound never healed.  In the past 2 days he noticed erythema and pus drainage from the wound so he was seen by Dr. Dasilva (ortho), who prescribed Keflex.  He was then instructed to go to ED for washout.  Patient underwent I&D, screw removal and arthroscopy.  He was started on IV cefazolin.  ID was consulted for abx rec.           PAST MEDICAL & SURGICAL HISTORY:  per HPI      Review of Systems:   Constitutional, eyes, ENT, cardiovascular, respiratory, gastrointestinal, genitourinary, integumentary, neurological, psychiatric and heme/lymph are otherwise negative other than noted above       ANTIBIOTICS:  MEDICATIONS  (STANDING):  aspirin enteric coated 325 milliGRAM(s) Oral two times a day  folic acid 1 milliGRAM(s) Oral daily  lactated ringers. 1000 milliLiter(s) (110 mL/Hr) IV Continuous <Continuous>  multivitamin 1 Tablet(s) Oral daily  pantoprazole    Tablet 40 milliGRAM(s) Oral before breakfast  polyethylene glycol 3350 17 Gram(s) Oral at bedtime  senna 2 Tablet(s) Oral at bedtime  vancomycin  IVPB 1000 milliGRAM(s) IV Intermittent every 8 hours    MEDICATIONS  (PRN):  acetaminophen     Tablet .. 650 milliGRAM(s) Oral every 6 hours PRN Temp greater or equal to 38C (100.4F), Mild Pain (1 - 3)  bisacodyl Suppository 10 milliGRAM(s) Rectal once PRN Constipation  HYDROmorphone  Injectable 0.5 milliGRAM(s) IV Push every 15 minutes PRN pacu  HYDROmorphone  Injectable 0.5 milliGRAM(s) IV Push every 4 hours PRN breakthrough  magnesium hydroxide Suspension 30 milliLiter(s) Oral daily PRN Constipation  ondansetron Injectable 4 milliGRAM(s) IV Push every 4 hours PRN Nausea and/or Vomiting  oxyCODONE    IR 10 milliGRAM(s) Oral every 4 hours PRN Severe Pain (7 - 10)  oxyCODONE    IR 5 milliGRAM(s) Oral every 4 hours PRN Moderate Pain (4 - 6)      Allergies    No Known Allergies    Intolerances        SOCIAL HISTORY:  Denied smoking/illicit.  Social EtOH.  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.  Family lives in [place] [facility]     FAMILY HISTORY:   no FH leading to current infection    Vital Signs Last 24 Hrs  T(C): 37 (12 Nov 2021 16:58), Max: 37 (12 Nov 2021 16:58)  T(F): 98.6 (12 Nov 2021 16:58), Max: 98.6 (12 Nov 2021 16:58)  HR: 90 (12 Nov 2021 16:58) (71 - 96)  BP: 152/61 (12 Nov 2021 16:58) (102/59 - 152/61)  BP(mean): 77 (12 Nov 2021 04:38) (77 - 77)  RR: 17 (12 Nov 2021 16:58) (16 - 17)  SpO2: 97% (12 Nov 2021 16:58) (95% - 98%)    11-11-21 @ 07:01  -  11-12-21 @ 07:00  --------------------------------------------------------  IN: 1550 mL / OUT: 600 mL / NET: 950 mL    11-12-21 @ 07:01  -  11-12-21 @ 20:56  --------------------------------------------------------  IN: 3005 mL / OUT: 2000 mL / NET: 1005 mL        PHYSICAL EXAM:  Constitutional: alert, NAD  Eyes: the sclera and conjunctiva were normal.   ENT: the ears and nose were normal in appearance.   Neck: the appearance of the neck was normal and the neck was supple.   Pulmonary: no respiratory distress and lungs were clear to auscultation bilaterally.   Heart: heart rate was normal and rhythm regular, normal S1 and S2  Ext: R knee wrapped with ACE, photo reviewed - large open wound   Abdomen: normal bowel sounds, soft, non-tender  Neurological: no focal deficits.   Psychiatric: the affect was normal      LABS:                        11.2   5.79  )-----------( 331      ( 12 Nov 2021 06:49 )             35.1     11-12    138  |  100  |  12  ----------------------------<  154<H>  4.5   |  27  |  0.79    Ca    9.2      12 Nov 2021 06:49      PT/INR - ( 11 Nov 2021 14:03 )   PT: 13.6 sec;   INR: 1.14          PTT - ( 11 Nov 2021 14:03 )  PTT:36.6 sec      MICROBIOLOGY:  11/12 AFB R tibia: p  11/12 Fungal R tibia: p  11/11 R tibia WCx: staph aureus      RADIOLOGY & ADDITIONAL STUDIES:

## 2021-11-12 NOTE — DISCHARGE NOTE PROVIDER - NSDCACTIVITY_GEN_ALL_CORE
Walking - Indoors allowed/No heavy lifting/straining/Follow Instructions Provided by your Surgical Team Do not drive or operate machinery/Stairs allowed/Walking - Indoors allowed/No heavy lifting/straining/Walking - Outdoors allowed/Follow Instructions Provided by your Surgical Team

## 2021-11-12 NOTE — PHYSICAL THERAPY INITIAL EVALUATION ADULT - PERTINENT HX OF CURRENT PROBLEM, REHAB EVAL
The patient is a 21 year old male s/p right ACL reconstruction in 2014 with subsequent revisions and recent infection s/p right knee washout in july 2021 sent to the ED by Dr. Dasilva with concern for continued infection. The patient states he had a right knee washout in july 2021 and was subsequently on IV antibiotics for 4 weeks.

## 2021-11-12 NOTE — DISCHARGE NOTE PROVIDER - NSDCCPCAREPLAN_GEN_ALL_CORE_FT
PRINCIPAL DISCHARGE DIAGNOSIS  Diagnosis: Septic arthritis of knee  Assessment and Plan of Treatment:        PRINCIPAL DISCHARGE DIAGNOSIS  Diagnosis: Septic arthritis of knee  Assessment and Plan of Treatment: imrpovement s/p washout, IV antibiotics

## 2021-11-12 NOTE — DISCHARGE NOTE PROVIDER - NSDCFUSCHEDAPPT_GEN_ALL_CORE_FT
[facility] ; 11/16/2021 ; [facility] ; 11/18/2021 ; [facility] Orth[facility] [address]

## 2021-11-12 NOTE — PROGRESS NOTE ADULT - SUBJECTIVE AND OBJECTIVE BOX
Ortho Floor Note    Subjective:  Pain controlled with medication.  Denies CP, SOB, N/V, numbness/tingling.    Objective:  Vital Signs Last 24 Hrs  T(C): 36.8 (12 Nov 2021 12:28), Max: 37 (11 Nov 2021 19:15)  T(F): 98.2 (12 Nov 2021 12:28), Max: 98.6 (11 Nov 2021 19:15)  HR: 85 (12 Nov 2021 12:28) (71 - 124)  BP: 107/61 (12 Nov 2021 12:28) (102/59 - 155/86)  BP(mean): 77 (12 Nov 2021 04:38) (77 - 116)  RR: 16 (12 Nov 2021 12:28) (16 - 20)  SpO2: 98% (12 Nov 2021 12:28) (97% - 100%)    General: Pt Alert and oriented, NAD  RLE:  Dressing C/D/I  Motor: 5/5 EHL/FHL/TA/GS b/l  Sensation: SILT throughout all nerve distributions  Pulses: Toes WWP    A/P: 21yMale s/p I&D of R ACL reconstruction on 11-11.  -Pain control as needed  -DVT prophylaxis: ASA  -PT, weight-bearing status: WBAT   -ID consulted - per Dr. Herminio littlejohn/jeremiah cantu, will appreciate further ID recommendations  -Per Dr. Dasilva, MRI right knee w/ extension to proximal tibial shaft w/ and w/o IV contrast ordered to eval for osteomyelitis   -plastic surgeon Dr. Ceballos to see patient today       Ortho Pager [phone]

## 2021-11-12 NOTE — PHYSICAL THERAPY INITIAL EVALUATION ADULT - GENERAL OBSERVATIONS, REHAB EVAL
Patient received semi-rodriguez in bed in NAD on RA, +SCDs, +Heplock. Cleared by RN. Agreeable to PT.

## 2021-11-12 NOTE — CONSULT NOTE ADULT - ASSESSMENT
21M h/o R ACL reconstruction in 2014, revision in 2016 and 7/1/21 c/b MSSA infection s/p I&D x 2 in 7/2021 s/p cefazolin x 4 weeks (ended in mid 9/2021) p/w recurrent R knee infection.  Per Dr. Dasilva, patient has open wound on R knee which was communicating to R joint space c/b septic arthritis.   Screw was removed and there is no foregin material left.  WCx growing staph aureus - suspect MSSA.  Since he failed 4 weeks of IV abx with recurrent, he will need longer course this time.  Patient to be seen by plastic for wound closure.      - cont vancomycin 1g IV q8h, check trough before 4th dose (goal 13-17)  - f/u staph aureus susceptibility  - stop zosyn  - place single lumen PICC on Monday  - f/u OR culture     Team 2 will follow you.  Dr. Scanlon is covering me this weekend.  Case d/w primary team.    Virginia Durham MD, MS  Infectious Disease attending  work cell [phone]   For any questions during evening/weekend/holiday, please page ID on call

## 2021-11-12 NOTE — DISCHARGE NOTE PROVIDER - CARE PROVIDER_API CALL
Michael Dasilva)  Orthopaedic Surgery  [address] [facility] [address], [address]th [place] [address]  Phone: [phone]  Fax: [phone]  Follow Up Time: 2 weeks    Virginia Durham)  Infectious Disease; Internal Medicine  [address] [place] [address], [address]th [place] [address]  Phone: [phone]  Fax: [phone]  Follow Up Time: 2 weeks

## 2021-11-12 NOTE — DISCHARGE NOTE PROVIDER - NSDCCPTREATMENT_GEN_ALL_CORE_FT
PRINCIPAL PROCEDURE  Procedure: Irrigation and debridement, knee  Findings and Treatment: Right; hx infected ACL repair

## 2021-11-13 LAB
-  CEFAZOLIN: SIGNIFICANT CHANGE UP
-  CLINDAMYCIN: SIGNIFICANT CHANGE UP
-  ERYTHROMYCIN: SIGNIFICANT CHANGE UP
-  LINEZOLID: SIGNIFICANT CHANGE UP
-  OXACILLIN: SIGNIFICANT CHANGE UP
-  RIFAMPIN: SIGNIFICANT CHANGE UP
-  TRIMETHOPRIM/SULFAMETHOXAZOLE: SIGNIFICANT CHANGE UP
-  VANCOMYCIN: SIGNIFICANT CHANGE UP
ANION GAP SERPL CALC-SCNC: 6 MMOL/L — SIGNIFICANT CHANGE UP (ref 5–17)
BUN SERPL-MCNC: 11 MG/DL — SIGNIFICANT CHANGE UP (ref 7–23)
CALCIUM SERPL-MCNC: 9.4 MG/DL — SIGNIFICANT CHANGE UP (ref 8.4–10.5)
CHLORIDE SERPL-SCNC: 104 MMOL/L — SIGNIFICANT CHANGE UP (ref 96–108)
CO2 SERPL-SCNC: 31 MMOL/L — SIGNIFICANT CHANGE UP (ref 22–31)
COVID-19 NUCLEOCAPSID GAM AB INTERP: NEGATIVE — SIGNIFICANT CHANGE UP
COVID-19 NUCLEOCAPSID TOTAL GAM ANTIBODY RESULT: 0.07 INDEX — SIGNIFICANT CHANGE UP
CREAT SERPL-MCNC: 0.86 MG/DL — SIGNIFICANT CHANGE UP (ref 0.5–1.3)
CRP SERPL-MCNC: 40.9 MG/L — HIGH (ref 0–4)
CULTURE RESULTS: SIGNIFICANT CHANGE UP
ERYTHROCYTE [SEDIMENTATION RATE] IN BLOOD: 58 MM/HR — HIGH
GLUCOSE SERPL-MCNC: 104 MG/DL — HIGH (ref 70–99)
HCT VFR BLD CALC: 32.7 % — LOW (ref 39–50)
HGB BLD-MCNC: 10.2 G/DL — LOW (ref 13–17)
MCHC RBC-ENTMCNC: 27.7 PG — SIGNIFICANT CHANGE UP (ref 27–34)
MCHC RBC-ENTMCNC: 31.2 GM/DL — LOW (ref 32–36)
MCV RBC AUTO: 88.9 FL — SIGNIFICANT CHANGE UP (ref 80–100)
METHOD TYPE: SIGNIFICANT CHANGE UP
NRBC # BLD: 0 /100 WBCS — SIGNIFICANT CHANGE UP (ref 0–0)
ORGANISM # SPEC MICROSCOPIC CNT: SIGNIFICANT CHANGE UP
ORGANISM # SPEC MICROSCOPIC CNT: SIGNIFICANT CHANGE UP
PLATELET # BLD AUTO: 278 K/UL — SIGNIFICANT CHANGE UP (ref 150–400)
POTASSIUM SERPL-MCNC: 4.1 MMOL/L — SIGNIFICANT CHANGE UP (ref 3.5–5.3)
POTASSIUM SERPL-SCNC: 4.1 MMOL/L — SIGNIFICANT CHANGE UP (ref 3.5–5.3)
RBC # BLD: 3.68 M/UL — LOW (ref 4.2–5.8)
RBC # FLD: 12 % — SIGNIFICANT CHANGE UP (ref 10.3–14.5)
RH IG SCN BLD-IMP: POSITIVE — SIGNIFICANT CHANGE UP
SARS-COV-2 IGG+IGM SERPL QL IA: 0.07 INDEX — SIGNIFICANT CHANGE UP
SARS-COV-2 IGG+IGM SERPL QL IA: NEGATIVE — SIGNIFICANT CHANGE UP
SODIUM SERPL-SCNC: 141 MMOL/L — SIGNIFICANT CHANGE UP (ref 135–145)
SPECIMEN SOURCE: SIGNIFICANT CHANGE UP
VANCOMYCIN TROUGH SERPL-MCNC: 12.8 UG/ML — SIGNIFICANT CHANGE UP (ref 10–20)
WBC # BLD: 6.08 K/UL — SIGNIFICANT CHANGE UP (ref 3.8–10.5)
WBC # FLD AUTO: 6.08 K/UL — SIGNIFICANT CHANGE UP (ref 3.8–10.5)

## 2021-11-13 PROCEDURE — 73723 MRI JOINT LWR EXTR W/O&W/DYE: CPT | Mod: 26,RT

## 2021-11-13 PROCEDURE — 99232 SBSQ HOSP IP/OBS MODERATE 35: CPT

## 2021-11-13 RX ORDER — NAFCILLIN 10 G/100ML
2 INJECTION, POWDER, FOR SOLUTION INTRAVENOUS EVERY 4 HOURS
Refills: 0 | Status: DISCONTINUED | OUTPATIENT
Start: 2021-11-13 | End: 2021-11-16

## 2021-11-13 RX ADMIN — Medication 325 MILLIGRAM(S): at 18:02

## 2021-11-13 RX ADMIN — Medication 1 MILLIGRAM(S): at 11:35

## 2021-11-13 RX ADMIN — POLYETHYLENE GLYCOL 3350 17 GRAM(S): 17 POWDER, FOR SOLUTION ORAL at 22:17

## 2021-11-13 RX ADMIN — Medication 250 MILLIGRAM(S): at 09:05

## 2021-11-13 RX ADMIN — NAFCILLIN 200 GRAM(S): 10 INJECTION, POWDER, FOR SOLUTION INTRAVENOUS at 22:17

## 2021-11-13 RX ADMIN — Medication 250 MILLIGRAM(S): at 01:01

## 2021-11-13 RX ADMIN — Medication 325 MILLIGRAM(S): at 06:12

## 2021-11-13 RX ADMIN — Medication 650 MILLIGRAM(S): at 22:47

## 2021-11-13 RX ADMIN — Medication 650 MILLIGRAM(S): at 13:44

## 2021-11-13 RX ADMIN — NAFCILLIN 200 GRAM(S): 10 INJECTION, POWDER, FOR SOLUTION INTRAVENOUS at 18:02

## 2021-11-13 RX ADMIN — SENNA PLUS 2 TABLET(S): 8.6 TABLET ORAL at 22:17

## 2021-11-13 RX ADMIN — Medication 650 MILLIGRAM(S): at 14:14

## 2021-11-13 RX ADMIN — PANTOPRAZOLE SODIUM 40 MILLIGRAM(S): 20 TABLET, DELAYED RELEASE ORAL at 06:12

## 2021-11-13 RX ADMIN — Medication 1 TABLET(S): at 11:34

## 2021-11-13 RX ADMIN — Medication 650 MILLIGRAM(S): at 22:17

## 2021-11-13 NOTE — PROGRESS NOTE ADULT - SUBJECTIVE AND OBJECTIVE BOX
INTERVAL HPI/OVERNIGHT EVENTS:    Coverage for Dr. Durham (Team 2)    Patient was seen and examined at bedside. No events.  S/P washout     CONSTITUTIONAL:  Negative fever or chills, feels well, good appetite  EYES:  Negative  blurry vision or double vision  CARDIOVASCULAR:  Negative for chest pain or palpitations  RESPIRATORY:  Negative for cough, wheezing, or SOB   GASTROINTESTINAL:  Negative for nausea, vomiting, diarrhea, constipation, or abdominal pain  GENITOURINARY:  Negative frequency, urgency or dysuria  NEUROLOGIC:  No headache, confusion, dizziness, lightheadedness      ANTIBIOTICS/RELEVANT:    MEDICATIONS  (STANDING):  aspirin enteric coated 325 milliGRAM(s) Oral two times a day  folic acid 1 milliGRAM(s) Oral daily  lactated ringers. 1000 milliLiter(s) (110 mL/Hr) IV Continuous <Continuous>  multivitamin 1 Tablet(s) Oral daily  pantoprazole    Tablet 40 milliGRAM(s) Oral before breakfast  polyethylene glycol 3350 17 Gram(s) Oral at bedtime  senna 2 Tablet(s) Oral at bedtime  vancomycin  IVPB 1000 milliGRAM(s) IV Intermittent every 8 hours    MEDICATIONS  (PRN):  acetaminophen     Tablet .. 650 milliGRAM(s) Oral every 6 hours PRN Temp greater or equal to 38C (100.4F), Mild Pain (1 - 3)  bisacodyl Suppository 10 milliGRAM(s) Rectal once PRN Constipation  HYDROmorphone  Injectable 0.5 milliGRAM(s) IV Push every 15 minutes PRN pacu  HYDROmorphone  Injectable 0.5 milliGRAM(s) IV Push every 4 hours PRN breakthrough  magnesium hydroxide Suspension 30 milliLiter(s) Oral daily PRN Constipation  ondansetron Injectable 4 milliGRAM(s) IV Push every 4 hours PRN Nausea and/or Vomiting  oxyCODONE    IR 10 milliGRAM(s) Oral every 4 hours PRN Severe Pain (7 - 10)  oxyCODONE    IR 5 milliGRAM(s) Oral every 4 hours PRN Moderate Pain (4 - 6)        Vital Signs Last 24 Hrs  T(C): 36.7 (13 Nov 2021 14:38), Max: 37.1 (12 Nov 2021 20:40)  T(F): 98.1 (13 Nov 2021 14:38), Max: 98.8 (12 Nov 2021 20:40)  HR: 73 (13 Nov 2021 14:38) (73 - 95)  BP: 100/64 (13 Nov 2021 14:38) (100/64 - 152/61)  BP(mean): --  RR: 18 (13 Nov 2021 14:38) (17 - 18)  SpO2: 97% (13 Nov 2021 14:38) (95% - 98%)    PHYSICAL EXAM:  Constitutional:  non-toxic, no distress  Eyes:  no icterus   Ear/Nose/Throat: no oral lesion  Neck:  supple  Respiratory: CTA laura  Cardiovascular: S1S2 RRR, no murmurs  Gastrointestinal:soft, (+) BS, no HSM  Extremities:  right knee with dressing in place   Vascular: DP Pulse:	right normal; left normal      LABS:                        10.2   6.08  )-----------( 278      ( 13 Nov 2021 08:05 )             32.7     11-13    141  |  104  |  11  ----------------------------<  104<H>  4.1   |  31  |  0.86    Ca    9.4      13 Nov 2021 08:05            MICROBIOLOGY:    Culture - Surgical Swab (11.11.21 @ 19:41)    Gram Stain:   No organisms seen  Few WBC's    -  Cefazolin: S <=4    -  Clindamycin: R <=0.25 This isolate is presumed to be clindamycin resistant based on detection of inducible resistance. Clindamycin may still be effective in some patients.    -  Erythromycin: R >4    -  Linezolid: S 1    -  Oxacillin: S <=0.25    -  RIF- Rifampin: S <=1 Should not be used as monotherapy    -  Trimethoprim/Sulfamethoxazole: S <=0.5/9.5    -  Vancomycin: S 2    Specimen Source: .Surgical Swab Right tibia    Culture Results:   Rare to few Staphylococcus aureus    Organism Identification: Staphylococcus aureus    Organism: Staphylococcus aureus    Method Type: BUSHRA        RADIOLOGY & ADDITIONAL STUDIES:

## 2021-11-13 NOTE — PROGRESS NOTE ADULT - SUBJECTIVE AND OBJECTIVE BOX
Ortho Floor Note    Subjective:  Pain controlled with medication.  Denies CP, SOB, N/V, numbness/tingling.    Objective:  Vital Signs Last 24 Hrs  T(C): 36.5 (13 Nov 2021 05:04), Max: 37.1 (12 Nov 2021 20:40)  T(F): 97.7 (13 Nov 2021 05:04), Max: 98.8 (12 Nov 2021 20:40)  HR: 88 (13 Nov 2021 05:04) (85 - 95)  BP: 105/69 (13 Nov 2021 05:04) (105/69 - 152/61)  BP(mean): --  RR: 17 (13 Nov 2021 05:04) (16 - 17)  SpO2: 98% (13 Nov 2021 05:04) (95% - 98%)    General: Pt Alert and oriented, NAD  RLE:  Dressing C/D/I  Motor: 5/5 EHL/FHL/TA/GS b/l  Sensation: SILT throughout all nerve distributions  Pulses: Toes WWP    A/P: 21yMale s/p I&D of R ACL reconstruction on 11-11.  -Pain control as needed  -DVT prophylaxis: ASA  -PT, weight-bearing status: WBAT   -ID recs - vancomycin 1g IV q8h, f/u S. aureus susceptibility  -MRI right knee w/ extension to proximal tibial shaft w/ and w/o IV contrast - eval for osteomyelitis   -F/u plastic surgery recs (Dr. Ceballos)  -PICC line Monday      Ortho Pager [phone]

## 2021-11-14 LAB
ANION GAP SERPL CALC-SCNC: 8 MMOL/L — SIGNIFICANT CHANGE UP (ref 5–17)
BUN SERPL-MCNC: 11 MG/DL — SIGNIFICANT CHANGE UP (ref 7–23)
CALCIUM SERPL-MCNC: 9.2 MG/DL — SIGNIFICANT CHANGE UP (ref 8.4–10.5)
CHLORIDE SERPL-SCNC: 104 MMOL/L — SIGNIFICANT CHANGE UP (ref 96–108)
CO2 SERPL-SCNC: 30 MMOL/L — SIGNIFICANT CHANGE UP (ref 22–31)
CREAT SERPL-MCNC: 0.99 MG/DL — SIGNIFICANT CHANGE UP (ref 0.5–1.3)
CRP SERPL-MCNC: 25.4 MG/L — HIGH (ref 0–4)
ERYTHROCYTE [SEDIMENTATION RATE] IN BLOOD: 52 MM/HR — HIGH
GLUCOSE SERPL-MCNC: 118 MG/DL — HIGH (ref 70–99)
HCT VFR BLD CALC: 33.5 % — LOW (ref 39–50)
HGB BLD-MCNC: 10.2 G/DL — LOW (ref 13–17)
MCHC RBC-ENTMCNC: 27.4 PG — SIGNIFICANT CHANGE UP (ref 27–34)
MCHC RBC-ENTMCNC: 30.4 GM/DL — LOW (ref 32–36)
MCV RBC AUTO: 90.1 FL — SIGNIFICANT CHANGE UP (ref 80–100)
NRBC # BLD: 0 /100 WBCS — SIGNIFICANT CHANGE UP (ref 0–0)
PLATELET # BLD AUTO: 273 K/UL — SIGNIFICANT CHANGE UP (ref 150–400)
POTASSIUM SERPL-MCNC: 4.1 MMOL/L — SIGNIFICANT CHANGE UP (ref 3.5–5.3)
POTASSIUM SERPL-SCNC: 4.1 MMOL/L — SIGNIFICANT CHANGE UP (ref 3.5–5.3)
RBC # BLD: 3.72 M/UL — LOW (ref 4.2–5.8)
RBC # FLD: 12.1 % — SIGNIFICANT CHANGE UP (ref 10.3–14.5)
SODIUM SERPL-SCNC: 142 MMOL/L — SIGNIFICANT CHANGE UP (ref 135–145)
WBC # BLD: 4.39 K/UL — SIGNIFICANT CHANGE UP (ref 3.8–10.5)
WBC # FLD AUTO: 4.39 K/UL — SIGNIFICANT CHANGE UP (ref 3.8–10.5)

## 2021-11-14 RX ADMIN — NAFCILLIN 200 GRAM(S): 10 INJECTION, POWDER, FOR SOLUTION INTRAVENOUS at 02:00

## 2021-11-14 RX ADMIN — PANTOPRAZOLE SODIUM 40 MILLIGRAM(S): 20 TABLET, DELAYED RELEASE ORAL at 06:40

## 2021-11-14 RX ADMIN — NAFCILLIN 200 GRAM(S): 10 INJECTION, POWDER, FOR SOLUTION INTRAVENOUS at 14:29

## 2021-11-14 RX ADMIN — NAFCILLIN 200 GRAM(S): 10 INJECTION, POWDER, FOR SOLUTION INTRAVENOUS at 10:39

## 2021-11-14 RX ADMIN — NAFCILLIN 200 GRAM(S): 10 INJECTION, POWDER, FOR SOLUTION INTRAVENOUS at 18:40

## 2021-11-14 RX ADMIN — Medication 325 MILLIGRAM(S): at 06:40

## 2021-11-14 RX ADMIN — SENNA PLUS 2 TABLET(S): 8.6 TABLET ORAL at 22:02

## 2021-11-14 RX ADMIN — Medication 1 TABLET(S): at 14:29

## 2021-11-14 RX ADMIN — Medication 650 MILLIGRAM(S): at 22:02

## 2021-11-14 RX ADMIN — NAFCILLIN 200 GRAM(S): 10 INJECTION, POWDER, FOR SOLUTION INTRAVENOUS at 22:03

## 2021-11-14 RX ADMIN — Medication 1 MILLIGRAM(S): at 14:42

## 2021-11-14 RX ADMIN — Medication 325 MILLIGRAM(S): at 18:42

## 2021-11-14 RX ADMIN — POLYETHYLENE GLYCOL 3350 17 GRAM(S): 17 POWDER, FOR SOLUTION ORAL at 22:03

## 2021-11-14 RX ADMIN — NAFCILLIN 200 GRAM(S): 10 INJECTION, POWDER, FOR SOLUTION INTRAVENOUS at 06:38

## 2021-11-14 RX ADMIN — Medication 650 MILLIGRAM(S): at 22:34

## 2021-11-14 NOTE — PROGRESS NOTE ADULT - SUBJECTIVE AND OBJECTIVE BOX
S: Patient seen and examined.  Chart reviewed.  Daily updates from Ortho team.  Patient currently resting comfortably in bed.  States pain well-controlled.  Patient's father is present for history and exam   RIGHT knee-  Postop dressing removed.  Arthroscopy wounds are healed with sutures.  TIbial wound is approximated well with Prolene sutures ( minimal serous exudate but no purulence and no skin slough  with cap refill < 2 seconds ).  Mild knee effusion ( markedly improved ).  Active knee extension and flexion.  Cap refill < 2 secs throughout.  Negative Jin's  A/P: s/p RIGHT knee arthroscopy with I and D / synovectomy and foreign body removal as well as leg / tibia I and D  - Continue Nafcillin per ID  - PICC line placement  - MRI: + Osteomyelitis without sequestrum.    - Appreciate ID and Plastics consults  - Plan for return to OR tomorrow for additional washout of knee and tibia/leg  - Discussed at length with patient and his father MRI and overall concerns with osteomyelitis and prior septic knee arthritis.  Treatment options discussed with recommendation for continued IV ABx and recommendation for additional washout of knee and tibial tunnel.  Discussed pending intraoperative evaluation as well as clinical labs trend, possible wound closure Thursday or Friday.  Discussed possible wound vac pending intraop evaluation at next washout.  Patient and his father express understanding and questions answered.

## 2021-11-14 NOTE — PRE-OP CHECKLIST - SURGICAL CONSENT
done Right knee arthroscopic irrigation and debridement, possible open irrigation and debridement, and all other indicated procedures/done

## 2021-11-14 NOTE — PROGRESS NOTE ADULT - SUBJECTIVE AND OBJECTIVE BOX
Ortho Floor Note    Subjective:  Pain controlled with medication.  Denies CP, SOB, N/V, numbness/tingling.    Objective:  Vital Signs Last 24 Hrs  T(C): 36.6 (14 Nov 2021 05:00), Max: 36.8 (13 Nov 2021 08:15)  T(F): 97.9 (14 Nov 2021 05:00), Max: 98.3 (13 Nov 2021 08:15)  HR: 73 (14 Nov 2021 05:00) (66 - 79)  BP: 98/60 (14 Nov 2021 05:00) (98/60 - 108/68)  BP(mean): 72 (14 Nov 2021 05:00) (72 - 72)  RR: 18 (14 Nov 2021 05:00) (17 - 18)  SpO2: 99% (14 Nov 2021 05:00) (97% - 100%)    General: Pt Alert and oriented, NAD  RLE:  Dressing C/D/I  Motor: 5/5 EHL/FHL/TA/GS b/l  Sensation: SILT throughout all nerve distributions  Pulses: Toes WWP    Imaging:  MRI R knee-  IMPRESSION:  1. Status post ACL graft reconstruction. Attenuated appearance of the graft, consistent with partial tearing.  2. Patchy bone marrow signal alterations in the distal femur and proximal tibia, with postcontrast enhancement, consistent with osteomyelitis. Peripherally-enhancing fluid collection within the tibial tunnel, consistent with an abscess, communicating with a subcutaneous abscess component in the anterior knee soft tissues. Associated sinus tract to the skin surface.  3. Heterogeneous anteromedial subcutaneous fluid collection, suspicious for a superinfected hematoma with abscess formation. This appears to communicate with the aforementioned anterior subcutaneous collection and sinus tract.  4. Knee joint effusion with evidence of synovitis, compatible with septic joint.  5. Edema and enhancement of the popliteus muscle, suspicious for myositis.  6. Tear of the lateral meniscus.  7. Chondral fissuring on the weight-bearing portions of the medial and lateral femoral condyles.  Thank you for allowing us to participate in the care of your patient.    A/P: 21yMale s/p I&D of R ACL reconstruction on 11-11.  -Pain control as needed  -DVT prophylaxis: ASA  -PT, weight-bearing status: WBAT   -ID recs - nafcillin 2g IV q4h  -MRI right knee obtained   -F/u plastic surgery recs (Dr. Ceballos)  -PICC line Monday      Ortho Pager [phone]

## 2021-11-15 ENCOUNTER — RESULT REVIEW (OUTPATIENT)
Age: 21
End: 2021-11-15

## 2021-11-15 LAB
ANION GAP SERPL CALC-SCNC: 8 MMOL/L — SIGNIFICANT CHANGE UP (ref 5–17)
APTT BLD: 37.1 SEC — HIGH (ref 27.5–35.5)
BUN SERPL-MCNC: 16 MG/DL — SIGNIFICANT CHANGE UP (ref 7–23)
CALCIUM SERPL-MCNC: 9.4 MG/DL — SIGNIFICANT CHANGE UP (ref 8.4–10.5)
CHLORIDE SERPL-SCNC: 106 MMOL/L — SIGNIFICANT CHANGE UP (ref 96–108)
CO2 SERPL-SCNC: 27 MMOL/L — SIGNIFICANT CHANGE UP (ref 22–31)
CREAT SERPL-MCNC: 0.99 MG/DL — SIGNIFICANT CHANGE UP (ref 0.5–1.3)
CRP SERPL-MCNC: 17.4 MG/L — HIGH (ref 0–4)
ERYTHROCYTE [SEDIMENTATION RATE] IN BLOOD: 59 MM/HR — HIGH
GLUCOSE SERPL-MCNC: 106 MG/DL — HIGH (ref 70–99)
HCT VFR BLD CALC: 36.1 % — LOW (ref 39–50)
HGB BLD-MCNC: 11.3 G/DL — LOW (ref 13–17)
INR BLD: 1.11 — SIGNIFICANT CHANGE UP (ref 0.88–1.16)
MCHC RBC-ENTMCNC: 28.2 PG — SIGNIFICANT CHANGE UP (ref 27–34)
MCHC RBC-ENTMCNC: 31.3 GM/DL — LOW (ref 32–36)
MCV RBC AUTO: 90 FL — SIGNIFICANT CHANGE UP (ref 80–100)
NRBC # BLD: 0 /100 WBCS — SIGNIFICANT CHANGE UP (ref 0–0)
PLATELET # BLD AUTO: 350 K/UL — SIGNIFICANT CHANGE UP (ref 150–400)
POTASSIUM SERPL-MCNC: 4.2 MMOL/L — SIGNIFICANT CHANGE UP (ref 3.5–5.3)
POTASSIUM SERPL-SCNC: 4.2 MMOL/L — SIGNIFICANT CHANGE UP (ref 3.5–5.3)
PROTHROM AB SERPL-ACNC: 13.3 SEC — SIGNIFICANT CHANGE UP (ref 10.6–13.6)
RBC # BLD: 4.01 M/UL — LOW (ref 4.2–5.8)
RBC # FLD: 12 % — SIGNIFICANT CHANGE UP (ref 10.3–14.5)
SARS-COV-2 RNA SPEC QL NAA+PROBE: SIGNIFICANT CHANGE UP
SODIUM SERPL-SCNC: 141 MMOL/L — SIGNIFICANT CHANGE UP (ref 135–145)
WBC # BLD: 6.64 K/UL — SIGNIFICANT CHANGE UP (ref 3.8–10.5)
WBC # FLD AUTO: 6.64 K/UL — SIGNIFICANT CHANGE UP (ref 3.8–10.5)

## 2021-11-15 PROCEDURE — 88300 SURGICAL PATH GROSS: CPT | Mod: 26

## 2021-11-15 PROCEDURE — 10121 INC&RMVL FB SUBQ TISS COMP: CPT | Mod: 78,RT,59

## 2021-11-15 PROCEDURE — 99232 SBSQ HOSP IP/OBS MODERATE 35: CPT

## 2021-11-15 PROCEDURE — 11044 DBRDMT BONE 1ST 20 SQ CM/<: CPT | Mod: 78,RT,59

## 2021-11-15 PROCEDURE — 29875 ARTHRS KNEE SURG SYNVCT LMTD: CPT | Mod: 78,RT

## 2021-11-15 RX ORDER — DIPHENHYDRAMINE HCL 50 MG
25 CAPSULE ORAL EVERY 4 HOURS
Refills: 0 | Status: DISCONTINUED | OUTPATIENT
Start: 2021-11-15 | End: 2021-11-23

## 2021-11-15 RX ORDER — HYDROCORTISONE 1 %
1 OINTMENT (GRAM) TOPICAL EVERY 12 HOURS
Refills: 0 | Status: DISCONTINUED | OUTPATIENT
Start: 2021-11-15 | End: 2021-11-23

## 2021-11-15 RX ADMIN — Medication 1 TABLET(S): at 12:50

## 2021-11-15 RX ADMIN — OXYCODONE HYDROCHLORIDE 10 MILLIGRAM(S): 5 TABLET ORAL at 23:23

## 2021-11-15 RX ADMIN — NAFCILLIN 200 GRAM(S): 10 INJECTION, POWDER, FOR SOLUTION INTRAVENOUS at 16:09

## 2021-11-15 RX ADMIN — NAFCILLIN 200 GRAM(S): 10 INJECTION, POWDER, FOR SOLUTION INTRAVENOUS at 19:56

## 2021-11-15 RX ADMIN — Medication 25 MILLIGRAM(S): at 12:50

## 2021-11-15 RX ADMIN — SODIUM CHLORIDE 110 MILLILITER(S): 9 INJECTION, SOLUTION INTRAVENOUS at 11:10

## 2021-11-15 RX ADMIN — PANTOPRAZOLE SODIUM 40 MILLIGRAM(S): 20 TABLET, DELAYED RELEASE ORAL at 06:00

## 2021-11-15 RX ADMIN — Medication 1 MILLIGRAM(S): at 12:50

## 2021-11-15 RX ADMIN — NAFCILLIN 200 GRAM(S): 10 INJECTION, POWDER, FOR SOLUTION INTRAVENOUS at 06:00

## 2021-11-15 RX ADMIN — NAFCILLIN 200 GRAM(S): 10 INJECTION, POWDER, FOR SOLUTION INTRAVENOUS at 02:00

## 2021-11-15 RX ADMIN — Medication 1 APPLICATION(S): at 19:03

## 2021-11-15 RX ADMIN — NAFCILLIN 200 GRAM(S): 10 INJECTION, POWDER, FOR SOLUTION INTRAVENOUS at 11:10

## 2021-11-15 NOTE — PACU DISCHARGE NOTE - COMMENTS
Pt has met criteria for discharge to floor.  Report called to TERRY Schreiber on [facility].  VSS.  Right toes remain warm, pink, mobile with strong pulses.  Pain 2/10 and acceptable to pt.  Dsg clean, dry, intact.  Iv remains patent.  Pt transported to floor by [facility].
Met PACU criteria for transfer to [facility]. Transferred via stretcher nonmonitored. Endorsed to [facility] RN (L).

## 2021-11-15 NOTE — PROGRESS NOTE ADULT - SUBJECTIVE AND OBJECTIVE BOX
Ortho Note    Pt seen and examined on morning rounds. Pt comfortable without complaints, pain controlled. Pending return to OR today for repeat R knee I&D with Brown.  Denies CP, SOB, N/V, numbness/tingling     Vital Signs Last 24 Hrs  T(C): 36.3 (11-15-21 @ 05:05), Max: 36.3 (11-15-21 @ 05:05)  T(F): 97.4 (11-15-21 @ 05:05), Max: 97.4 (11-15-21 @ 05:05)  HR: 73 (11-15-21 @ 05:05) (73 - 73)  BP: 105/68 (11-15-21 @ 05:05) (105/68 - 105/68)  BP(mean): 81 (11-15-21 @ 05:05) (81 - 81)  RR: 17 (11-15-21 @ 05:05) (17 - 17)  SpO2: 98% (11-15-21 @ 05:05) (98% - 98%)  I&O's Summary    14 Nov 2021 07:01  -  15 Nov 2021 07:00  --------------------------------------------------------  IN: 1440 mL / OUT: 550 mL / NET: 890 mL        Physical Exam:  General: Pt Alert and oriented, NAD  RLE  DSG gauze/teg C/D/I  Pulses: 2+ dp, pt pulses, toes wwp, cap refill <3 seconds  Sensation: SILT sural/saph/sp/dp/ tibial distributions  Motor: EHL/FHL/TA/GS 5/5                          11.3   6.64  )-----------( 350      ( 15 Nov 2021 06:13 )             36.1     11-15    141  |  106  |  16  ----------------------------<  106<H>  4.2   |  27  |  0.99    Ca    9.4      15 Nov 2021 06:13        A/P: 21yMale s/p I&D of R ACL reconstruction on 11-11. pending RTOR today 11/15 with Dr. Dasilva for repeat R knee washout.  -Pain control as needed  -trend daily ESR, CRP  -ID recs - nafcillin 2g IV q4h, +MSSA on cx  -PT, weight-bearing status: WBAT   -DVT prophylaxis: ASA  -PICC line today    Jerry Serrato, PGY-1  Ortho Pager [phone]

## 2021-11-15 NOTE — PROGRESS NOTE ADULT - SUBJECTIVE AND OBJECTIVE BOX
Ortho Note    Pt comfortable, states has had rash to RUE & RLE since surgery.   Denies CP, SOB, N/V, numbness/tingling     Vital Signs Last 24 Hrs  T(C): 36.8 (11-15-21 @ 12:44), Max: 36.8 (11-15-21 @ 10:35)  T(F): 98.2 (11-15-21 @ 12:44), Max: 98.2 (11-15-21 @ 10:35)  HR: 68 (11-15-21 @ 12:44) (68 - 76)  BP: 107/60 (11-15-21 @ 12:44) (107/60 - 113/71)  BP(mean): --  RR: 18 (11-15-21 @ 12:44) (16 - 18)  SpO2: 97% (11-15-21 @ 12:44) (97% - 97%)  I&O's Summary    14 Nov 2021 07:01  -  15 Nov 2021 07:00  --------------------------------------------------------  IN: 1440 mL / OUT: 875 mL / NET: 565 mL    15 Nov 2021 07:01  -  15 Nov 2021 16:19  --------------------------------------------------------  IN: 990 mL / OUT: 600 mL / NET: 390 mL        General: Pt Alert and oriented, NAD  DSG C/D/I Right knee  Diffuse rash to RLE and RUE, not visualized elsewhere  Pulses intact RLE  Sensation intact RLE  Motor: EHL/FHL/TA/GS 5/5 RLE                          11.3   6.64  )-----------( 350      ( 15 Nov 2021 06:13 )             36.1     11-15    141  |  106  |  16  ----------------------------<  106<H>  4.2   |  27  |  0.99    Ca    9.4      15 Nov 2021 06:13        A/P: 21yMale POD#4 s/p Right knee washout  - Stable  - ID recs appreciated  - IV abx  - for repeat washout today  - NPO/IVF  - Pain Control  - DVT ppx: scds  - OOB/wbat  - PICC tomorrow  - benadryl/hydrocortisone to rash    Ortho Pager [phone]

## 2021-11-15 NOTE — PROGRESS NOTE ADULT - SUBJECTIVE AND OBJECTIVE BOX
INFECTIOUS DISEASES CONSULT FOLLOW-UP NOTE    INTERVAL HPI/OVERNIGHT EVENTS:  no event overnight  patient feels well, denied fever/chills, n/v/d  awaiting 2nd washout tonight      ROS:   Constitutional, eyes, ENT, cardiovascular, respiratory, gastrointestinal, genitourinary, integumentary, neurological, psychiatric and heme/lymph are otherwise negative other than noted above       ANTIBIOTICS/RELEVANT:    MEDICATIONS  (STANDING):  folic acid 1 milliGRAM(s) Oral daily  lactated ringers. 1000 milliLiter(s) (110 mL/Hr) IV Continuous <Continuous>  multivitamin 1 Tablet(s) Oral daily  nafcillin  IVPB 2 Gram(s) IV Intermittent every 4 hours  pantoprazole    Tablet 40 milliGRAM(s) Oral before breakfast  polyethylene glycol 3350 17 Gram(s) Oral at bedtime  senna 2 Tablet(s) Oral at bedtime    MEDICATIONS  (PRN):  acetaminophen     Tablet .. 650 milliGRAM(s) Oral every 6 hours PRN Temp greater or equal to 38C (100.4F), Mild Pain (1 - 3)  bisacodyl Suppository 10 milliGRAM(s) Rectal once PRN Constipation  diphenhydrAMINE 25 milliGRAM(s) Oral every 4 hours PRN Rash and/or Itching  HYDROmorphone  Injectable 0.5 milliGRAM(s) IV Push every 15 minutes PRN pacu  HYDROmorphone  Injectable 0.5 milliGRAM(s) IV Push every 4 hours PRN breakthrough  magnesium hydroxide Suspension 30 milliLiter(s) Oral daily PRN Constipation  ondansetron Injectable 4 milliGRAM(s) IV Push every 4 hours PRN Nausea and/or Vomiting  oxyCODONE    IR 10 milliGRAM(s) Oral every 4 hours PRN Severe Pain (7 - 10)  oxyCODONE    IR 5 milliGRAM(s) Oral every 4 hours PRN Moderate Pain (4 - 6)        Vital Signs Last 24 Hrs  T(C): 36.8 (15 Nov 2021 10:35), Max: 36.9 (14 Nov 2021 17:20)  T(F): 98.2 (15 Nov 2021 10:35), Max: 98.4 (14 Nov 2021 17:20)  HR: 76 (15 Nov 2021 10:35) (62 - 82)  BP: 113/71 (15 Nov 2021 10:35) (105/68 - 115/77)  BP(mean): 81 (15 Nov 2021 05:05) (81 - 81)  RR: 16 (15 Nov 2021 10:35) (16 - 18)  SpO2: 97% (15 Nov 2021 10:35) (97% - 99%)    11-14-21 @ 07:01  -  11-15-21 @ 07:00  --------------------------------------------------------  IN: 1440 mL / OUT: 875 mL / NET: 565 mL    11-15-21 @ 07:01  -  11-15-21 @ 11:56  --------------------------------------------------------  IN: 300 mL / OUT: 0 mL / NET: 300 mL      PHYSICAL EXAM:  Constitutional: alert, NAD  Eyes: the sclera and conjunctiva were normal.   ENT: the ears and nose were normal in appearance.   Neck: the appearance of the neck was normal and the neck was supple.   Pulmonary: no respiratory distress and lungs were clear to auscultation bilaterally.   Heart: heart rate was normal and rhythm regular, normal S1 and S2  Ext: R knee with 5cm surgical wound with suture   Abdomen: normal bowel sounds, soft, non-tender  Neurological: no focal deficits.   Psychiatric: the affect was normal        LABS:                        11.3   6.64  )-----------( 350      ( 15 Nov 2021 06:13 )             36.1     11-15    141  |  106  |  16  ----------------------------<  106<H>  4.2   |  27  |  0.99    Ca    9.4      15 Nov 2021 06:13      PT/INR - ( 15 Nov 2021 06:13 )   PT: 13.3 sec;   INR: 1.11          PTT - ( 15 Nov 2021 06:13 )  PTT:37.1 sec      MICROBIOLOGY:  11/12 R tibia fungal Cx:p  11/11 R tibia WCx: MSSA (S to cefaz, clinda, linezo, ox, rifam, bact, vanco)      RADIOLOGY & ADDITIONAL STUDIES:  MRI knee 11/13:   Impression:  Status post ACL reconstruction. Septic arthritis/osteomyelitis as above.  Horizontal tear at the posterior horn of the lateral meniscus.

## 2021-11-15 NOTE — PROVIDER CONTACT NOTE (OTHER) - SITUATION
pt changed his HCP in writing. Now HCP is Pancho Lazar- [phone]. [address] [facility], [place]  pt's "father".  please see new HCP form filled in chart
Raised rash noted to right arm and right lower extremity

## 2021-11-16 ENCOUNTER — APPOINTMENT (OUTPATIENT)
Dept: ORTHOPEDIC SURGERY | Facility: CLINIC | Age: 21
End: 2021-11-16

## 2021-11-16 LAB
ANION GAP SERPL CALC-SCNC: 9 MMOL/L — SIGNIFICANT CHANGE UP (ref 5–17)
BASOPHILS # BLD AUTO: 0.02 K/UL — SIGNIFICANT CHANGE UP (ref 0–0.2)
BASOPHILS NFR BLD AUTO: 0.2 % — SIGNIFICANT CHANGE UP (ref 0–2)
BUN SERPL-MCNC: 9 MG/DL — SIGNIFICANT CHANGE UP (ref 7–23)
CALCIUM SERPL-MCNC: 8.7 MG/DL — SIGNIFICANT CHANGE UP (ref 8.4–10.5)
CHLORIDE SERPL-SCNC: 104 MMOL/L — SIGNIFICANT CHANGE UP (ref 96–108)
CO2 SERPL-SCNC: 27 MMOL/L — SIGNIFICANT CHANGE UP (ref 22–31)
CREAT SERPL-MCNC: 0.97 MG/DL — SIGNIFICANT CHANGE UP (ref 0.5–1.3)
CRP SERPL-MCNC: 12 MG/L — HIGH (ref 0–4)
EOSINOPHIL # BLD AUTO: 0.03 K/UL — SIGNIFICANT CHANGE UP (ref 0–0.5)
EOSINOPHIL NFR BLD AUTO: 0.3 % — SIGNIFICANT CHANGE UP (ref 0–6)
ERYTHROCYTE [SEDIMENTATION RATE] IN BLOOD: 48 MM/HR — HIGH
GLUCOSE SERPL-MCNC: 143 MG/DL — HIGH (ref 70–99)
GRAM STN FLD: SIGNIFICANT CHANGE UP
GRAM STN FLD: SIGNIFICANT CHANGE UP
HCT VFR BLD CALC: 33.4 % — LOW (ref 39–50)
HGB BLD-MCNC: 10.5 G/DL — LOW (ref 13–17)
IMM GRANULOCYTES NFR BLD AUTO: 0.7 % — SIGNIFICANT CHANGE UP (ref 0–1.5)
LYMPHOCYTES # BLD AUTO: 1.28 K/UL — SIGNIFICANT CHANGE UP (ref 1–3.3)
LYMPHOCYTES # BLD AUTO: 14.4 % — SIGNIFICANT CHANGE UP (ref 13–44)
MCHC RBC-ENTMCNC: 28 PG — SIGNIFICANT CHANGE UP (ref 27–34)
MCHC RBC-ENTMCNC: 31.4 GM/DL — LOW (ref 32–36)
MCV RBC AUTO: 89.1 FL — SIGNIFICANT CHANGE UP (ref 80–100)
MONOCYTES # BLD AUTO: 0.44 K/UL — SIGNIFICANT CHANGE UP (ref 0–0.9)
MONOCYTES NFR BLD AUTO: 4.9 % — SIGNIFICANT CHANGE UP (ref 2–14)
NEUTROPHILS # BLD AUTO: 7.08 K/UL — SIGNIFICANT CHANGE UP (ref 1.8–7.4)
NEUTROPHILS NFR BLD AUTO: 79.5 % — HIGH (ref 43–77)
NRBC # BLD: 0 /100 WBCS — SIGNIFICANT CHANGE UP (ref 0–0)
PLATELET # BLD AUTO: 356 K/UL — SIGNIFICANT CHANGE UP (ref 150–400)
POTASSIUM SERPL-MCNC: 4 MMOL/L — SIGNIFICANT CHANGE UP (ref 3.5–5.3)
POTASSIUM SERPL-SCNC: 4 MMOL/L — SIGNIFICANT CHANGE UP (ref 3.5–5.3)
RBC # BLD: 3.75 M/UL — LOW (ref 4.2–5.8)
RBC # FLD: 12.1 % — SIGNIFICANT CHANGE UP (ref 10.3–14.5)
SODIUM SERPL-SCNC: 140 MMOL/L — SIGNIFICANT CHANGE UP (ref 135–145)
SPECIMEN SOURCE: SIGNIFICANT CHANGE UP
SPECIMEN SOURCE: SIGNIFICANT CHANGE UP
WBC # BLD: 8.91 K/UL — SIGNIFICANT CHANGE UP (ref 3.8–10.5)
WBC # FLD AUTO: 8.91 K/UL — SIGNIFICANT CHANGE UP (ref 3.8–10.5)

## 2021-11-16 PROCEDURE — 93010 ELECTROCARDIOGRAM REPORT: CPT

## 2021-11-16 PROCEDURE — 36573 INSJ PICC RS&I 5 YR+: CPT

## 2021-11-16 PROCEDURE — 76937 US GUIDE VASCULAR ACCESS: CPT | Mod: 26,59

## 2021-11-16 PROCEDURE — 99232 SBSQ HOSP IP/OBS MODERATE 35: CPT

## 2021-11-16 RX ORDER — SODIUM CHLORIDE 9 MG/ML
1000 INJECTION, SOLUTION INTRAVENOUS
Refills: 0 | Status: DISCONTINUED | OUTPATIENT
Start: 2021-11-16 | End: 2021-11-18

## 2021-11-16 RX ORDER — CEFAZOLIN SODIUM 1 G
2000 VIAL (EA) INJECTION EVERY 8 HOURS
Refills: 0 | Status: DISCONTINUED | OUTPATIENT
Start: 2021-11-16 | End: 2021-11-23

## 2021-11-16 RX ORDER — SENNA PLUS 8.6 MG/1
2 TABLET ORAL
Qty: 0 | Refills: 0 | DISCHARGE
Start: 2021-11-16

## 2021-11-16 RX ORDER — CEFAZOLIN SODIUM 1 G
2 VIAL (EA) INJECTION
Qty: 252 | Refills: 0
Start: 2021-11-16 | End: 2021-12-27

## 2021-11-16 RX ORDER — CHLORHEXIDINE GLUCONATE 213 G/1000ML
1 SOLUTION TOPICAL
Refills: 0 | Status: DISCONTINUED | OUTPATIENT
Start: 2021-11-16 | End: 2021-11-23

## 2021-11-16 RX ORDER — SODIUM CHLORIDE 9 MG/ML
10 INJECTION INTRAMUSCULAR; INTRAVENOUS; SUBCUTANEOUS
Refills: 0 | Status: DISCONTINUED | OUTPATIENT
Start: 2021-11-16 | End: 2021-11-23

## 2021-11-16 RX ADMIN — Medication 1 APPLICATION(S): at 19:32

## 2021-11-16 RX ADMIN — Medication 100 MILLIGRAM(S): at 12:17

## 2021-11-16 RX ADMIN — NAFCILLIN 200 GRAM(S): 10 INJECTION, POWDER, FOR SOLUTION INTRAVENOUS at 00:05

## 2021-11-16 RX ADMIN — Medication 650 MILLIGRAM(S): at 19:30

## 2021-11-16 RX ADMIN — NAFCILLIN 200 GRAM(S): 10 INJECTION, POWDER, FOR SOLUTION INTRAVENOUS at 07:53

## 2021-11-16 RX ADMIN — Medication 1 APPLICATION(S): at 05:42

## 2021-11-16 RX ADMIN — Medication 1 MILLIGRAM(S): at 12:17

## 2021-11-16 RX ADMIN — Medication 100 MILLIGRAM(S): at 19:00

## 2021-11-16 RX ADMIN — Medication 650 MILLIGRAM(S): at 19:00

## 2021-11-16 RX ADMIN — NAFCILLIN 200 GRAM(S): 10 INJECTION, POWDER, FOR SOLUTION INTRAVENOUS at 04:17

## 2021-11-16 RX ADMIN — PANTOPRAZOLE SODIUM 40 MILLIGRAM(S): 20 TABLET, DELAYED RELEASE ORAL at 07:53

## 2021-11-16 RX ADMIN — Medication 1 TABLET(S): at 12:17

## 2021-11-16 NOTE — CONSULT NOTE ADULT - SUBJECTIVE AND OBJECTIVE BOX
Vascular Access Service Consult Note    [facility]  Infection of right knee               Diagnosis: knee infection    Indications for Vascular Access (Check all that apply)  [x  ]  Antibiotic Therapy       Antibiotic Prescribed: nafcillin x 6 weeks                                                                                      [  ]  IV Hydration  [  ]  Total Parenteral Nutrition  [  ]  Chemotherapy  [  ]  Difficult Venous Access  [  ]  CVP monitoring  [  ]  Medications with high potential for tissue necrosis on extravasation  [  ]  Other    Screening (Check all that apply)  Previous Radiation to chest  [  ] Yes      [ x ]  No  Breast Cancer                          [  ] Left     [  ]  Right    [ x ]  No  Lymph Node Dissection         [  ] Left     [  ]  Right    [x  ]  No  Pacemaker or ICD                   [  ] Left     [  ]  Right    [ x ]  No  Upper Extremity DVT             [  ] Left     [  ]  Right    [ x ]  No  Chronic Kidney Disease         [  ]  Yes     [ x ]  No  Hemodialysis                           [  ]  Yes     [x  ]  No  AV Fistula/ Graft                     [  ]  Left    [  ]  Right    [x  ]  No  Temp>101F in past 24 H       [  ]  Yes     [x  ]  No  H/O PICC/Midline                   [  ]  Yes     [ xx ]  No    Lab data:                        10.5   8.91  )-----------( 356      ( 16 Nov 2021 05:59 )             33.4     11-16    140  |  104  |  9   ----------------------------<  143<H>  4.0   |  27  |  0.97    Ca    8.7      16 Nov 2021 05:59      PT/INR - ( 15 Nov 2021 06:13 )   PT: 13.3 sec;   INR: 1.11          PTT - ( 15 Nov 2021 06:13 )  PTT:37.1 sec          I have reviewed the chart, interviewed and examined the patient and determined that this patient:  [ x ] Is a candidate for a PICC line  [  ] Is a candidate for a Midline  [  ] Is not a candidate for vascular access device (reason)    Lumens:    [ x ] Single  [  ] Double Vascular Access Service Consult Note    [facility]:  Infection of right knee               Diagnosis: knee infection    Indications for Vascular Access (Check all that apply)  [x  ]  Antibiotic Therapy       Antibiotic Prescribed: cefazolin x 6 weeks                                                                                      [  ]  IV Hydration  [  ]  Total Parenteral Nutrition  [  ]  Chemotherapy  [  ]  Difficult Venous Access  [  ]  CVP monitoring  [  ]  Medications with high potential for tissue necrosis on extravasation  [  ]  Other    Screening (Check all that apply)  Previous Radiation to chest  [  ] Yes      [ x ]  No  Breast Cancer                          [  ] Left     [  ]  Right    [ x ]  No  Lymph Node Dissection         [  ] Left     [  ]  Right    [x  ]  No  Pacemaker or ICD                   [  ] Left     [  ]  Right    [ x ]  No  Upper Extremity DVT             [  ] Left     [  ]  Right    [ x ]  No  Chronic Kidney Disease         [  ]  Yes     [ x ]  No  Hemodialysis                           [  ]  Yes     [x  ]  No  AV Fistula/ Graft                     [  ]  Left    [  ]  Right    [x  ]  No  Temp>101F in past 24 H       [  ]  Yes     [x  ]  No  H/O PICC/Midline                   [  X]  Yes     [  ]  No    Lab data:                        10.5   8.91  )-----------( 356      ( 16 Nov 2021 05:59 )             33.4     11-16    140  |  104  |  9   ----------------------------<  143<H>  4.0   |  27  |  0.97    Ca    8.7      16 Nov 2021 05:59      PT/INR - ( 15 Nov 2021 06:13 )   PT: 13.3 sec;   INR: 1.11          PTT - ( 15 Nov 2021 06:13 )  PTT:37.1 sec          I have reviewed the chart, interviewed and examined the patient and determined that this patient:  [ x ] Is a candidate for a PICC line  [  ] Is a candidate for a Midline  [  ] Is not a candidate for vascular access device (reason)    Lumens:    [ x ] Single  [  ] Double

## 2021-11-16 NOTE — PROGRESS NOTE ADULT - SUBJECTIVE AND OBJECTIVE BOX
Ortho Note    Pt seen and examined on morning rounds. Pt comfortable without complaints, pain controlled.  Denies CP, SOB, N/V, numbness/tingling     Vital Signs Last 24 Hrs  T(C): 36.8 (11-16-21 @ 04:52), Max: 36.8 (11-16-21 @ 04:52)  T(F): 98.2 (11-16-21 @ 04:52), Max: 98.2 (11-16-21 @ 04:52)  HR: 100 (11-16-21 @ 04:52) (100 - 100)  BP: 120/65 (11-16-21 @ 04:52) (120/65 - 120/65)  BP(mean): --  RR: 19 (11-16-21 @ 04:52) (19 - 19)  SpO2: 97% (11-16-21 @ 04:52) (97% - 97%)  I&O's Summary    15 Nov 2021 07:01  -  16 Nov 2021 07:00  --------------------------------------------------------  IN: 2795 mL / OUT: 1325 mL / NET: 1470 mL        Physical Exam:  General: Pt Alert and oriented, NAD  DSG C/D/I Right knee  Rash to RLE and RUE decreasing in extent compared to yesterday, not visualized elsewhere  Pulses intact RLE  Sensation intact RLE  Motor: EHL/FHL/TA/GS 5/5 RLE                          10.5   8.91  )-----------( 356      ( 16 Nov 2021 05:59 )             33.4     11-16    140  |  104  |  9   ----------------------------<  143<H>  4.0   |  27  |  0.97    Ca    8.7      16 Nov 2021 05:59        A/P: 21yMale s/p Right knee washout 11/11, now POD1 for s/p R knee washout yesterday with Brown 11/15  - Stable  - ID recs appreciated  - Abx: nafcillin  - Pain Control  - PICC today  - DVT ppx: scds  - WBS: WBAT  - benadryl/hydrocortisone to rash  - Dispo: pending final ID recs      Jerry Serrato, PGY-1  Ortho Pager [phone]

## 2021-11-16 NOTE — PROGRESS NOTE ADULT - TIME BILLING
management of R septic arthritis, OM
management of OM, septic arthritis
septic arthritis due to MSSA

## 2021-11-16 NOTE — PROGRESS NOTE ADULT - SUBJECTIVE AND OBJECTIVE BOX
Ortho Post Op Check    Procedure: R Knee Arthroscopic Washout  Surgeon: Dr. JAIR Dasilva    Pt comfortable without complaints, pain controlled  Denies CP, SOB, N/V, numbness/tingling     Vital Signs Last 24 Hrs  T(C): 36.3 (11-16-21 @ 00:35), Max: 36.3 (11-15-21 @ 21:59)  T(F): 97.3 (11-16-21 @ 00:35), Max: 97.3 (11-15-21 @ 21:59)  HR: 71 (11-16-21 @ 00:35) (71 - 108)  BP: 121/79 (11-16-21 @ 00:35) (117/76 - 138/85)  BP(mean): 104 (11-15-21 @ 22:54) (89 - 104)  RR: 19 (11-16-21 @ 00:35) (13 - 19)  SpO2: 99% (11-16-21 @ 00:35) (96% - 100%)    General: Pt Alert and oriented, NAD  ACE DSG C/D/I  Pulses: 2+ DP  Sensation: SILT grossly SPN/DPN/Saph/Mima/Tib  Motor: 5/5 TA/GS/EHL, Quad/Psoas firing limited 2/2 pain      A/P: 21yMale s/p R Knee Arthroscopic Washout by Dr. JAIR Dasilva on 11-15  - Stable  - Pain Control  - DVT ppx: SCD  - Post op abx: Nafcillin  - WBS: WBAT  - PT eval     Ortho Pager [phone]

## 2021-11-16 NOTE — PROGRESS NOTE ADULT - SUBJECTIVE AND OBJECTIVE BOX
STATUS POST: Right knee I&D ON 11/11, repeat I&D with hardware removal on 11/15 s/p  right knee ACL reconstruction and infection.      SUBJECTIVE: Patient seen and examined. Pt. states he feels much better, rash that was on his arm is mostly gone and right thigh has lessened too with no itchiness. Pt. knows he will be going back to the OR this week, plan for home abx. Denies any sob/cp/n/v/numbness or tingling in b/l les.     OBJECTIVE:     Vital Signs Last 24 Hrs  T(C): 36.9 (16 Nov 2021 11:49), Max: 36.9 (16 Nov 2021 08:47)  T(F): 98.4 (16 Nov 2021 11:49), Max: 98.4 (16 Nov 2021 08:47)  HR: 98 (16 Nov 2021 11:49) (66 - 108)  BP: 111/69 (16 Nov 2021 11:49) (102/55 - 138/85)  BP(mean): 104 (15 Nov 2021 22:54) (89 - 104)  RR: 18 (16 Nov 2021 11:49) (13 - 19)  SpO2: 96% (16 Nov 2021 11:49) (96% - 100%)    Affected extremity: right le, hives along anterior thigh (per patient less than yesterday)         Dressing: clean/dry/intact in ace wrap         Sensation: intact to light touch to patient's baseline         Motor exam: EHL/TA/GS 5/5  Pulses 2+             I&O's Detail    15 Nov 2021 07:01  -  16 Nov 2021 07:00  --------------------------------------------------------  IN:    IV PiggyBack: 100 mL    Lactated Ringers: 2145 mL    Oral Fluid: 550 mL  Total IN: 2795 mL    OUT:    Voided (mL): 1325 mL  Total OUT: 1325 mL    Total NET: 1470 mL      16 Nov 2021 07:01  -  16 Nov 2021 13:54  --------------------------------------------------------  IN:    Oral Fluid: 700 mL  Total IN: 700 mL    OUT:    Voided (mL): 750 mL  Total OUT: 750 mL    Total NET: -50 mL          LABS:                        10.5   8.91  )-----------( 356      ( 16 Nov 2021 05:59 )             33.4     11-16    140  |  104  |  9   ----------------------------<  143<H>  4.0   |  27  |  0.97    Ca    8.7      16 Nov 2021 05:59      PT/INR - ( 15 Nov 2021 06:13 )   PT: 13.3 sec;   INR: 1.11          PTT - ( 15 Nov 2021 06:13 )  PTT:37.1 sec      MEDICATIONS:  ceFAZolin   IVPB 2000 milliGRAM(s) IV Intermittent every 8 hours    acetaminophen     Tablet .. 650 milliGRAM(s) Oral every 6 hours PRN  diphenhydrAMINE 25 milliGRAM(s) Oral every 4 hours PRN  HYDROmorphone  Injectable 0.5 milliGRAM(s) IV Push every 15 minutes PRN  HYDROmorphone  Injectable 0.5 milliGRAM(s) IV Push every 4 hours PRN  ondansetron Injectable 4 milliGRAM(s) IV Push every 4 hours PRN  oxyCODONE    IR 10 milliGRAM(s) Oral every 4 hours PRN  oxyCODONE    IR 5 milliGRAM(s) Oral every 4 hours PRN          ASSESSMENT AND PLAN: 20yo Male s/p Right knee I&D ON 11/11, repeat I&D with hardware removal on 11/15 s/p  right knee ACL reconstruction and infection.        1. Analgesic pain control  2. DVT prophylaxis:   SCDs       3. Weight Bearing Status:  Weight bearing as tolerated      4. Disposition: Home    5. ID recs- +MSSA infection, changed from nafcillin to ancef 2g q8h x 6 weeks.   6. Plan for OR on11/18 with Dr. Ceballos for right knee I&D, closure with local flaps, possible gastrocnemius muscle flap and skin graft. Likely contact dermatitis for rash. Must get coverage for [place] and will travel back home to [place] for 2nd half of abx for home infusions ( aware).   7. Picc line inserted today for 6w abx    STATUS POST: Right knee I&D ON 11/11, repeat I&D with hardware removal on 11/15 s/p  right knee ACL reconstruction and infection.      SUBJECTIVE: Patient seen and examined. Pt. states he feels much better, rash that was on his arm is mostly gone and right thigh has lessened too with no itchiness. Pt. knows he will be going back to the OR this week, plan for home abx. Denies any sob/cp/n/v/numbness or tingling in b/l les.     OBJECTIVE:     Vital Signs Last 24 Hrs  T(C): 36.9 (16 Nov 2021 11:49), Max: 36.9 (16 Nov 2021 08:47)  T(F): 98.4 (16 Nov 2021 11:49), Max: 98.4 (16 Nov 2021 08:47)  HR: 98 (16 Nov 2021 11:49) (66 - 108)  BP: 111/69 (16 Nov 2021 11:49) (102/55 - 138/85)  BP(mean): 104 (15 Nov 2021 22:54) (89 - 104)  RR: 18 (16 Nov 2021 11:49) (13 - 19)  SpO2: 96% (16 Nov 2021 11:49) (96% - 100%)    Affected extremity: right le, hives along anterior thigh (per patient less than yesterday)         Dressing: clean/dry/intact in ace wrap         Sensation: intact to light touch to patient's baseline         Motor exam: EHL/TA/GS 5/5  Pulses 2+             I&O's Detail    15 Nov 2021 07:01  -  16 Nov 2021 07:00  --------------------------------------------------------  IN:    IV PiggyBack: 100 mL    Lactated Ringers: 2145 mL    Oral Fluid: 550 mL  Total IN: 2795 mL    OUT:    Voided (mL): 1325 mL  Total OUT: 1325 mL    Total NET: 1470 mL      16 Nov 2021 07:01  -  16 Nov 2021 13:54  --------------------------------------------------------  IN:    Oral Fluid: 700 mL  Total IN: 700 mL    OUT:    Voided (mL): 750 mL  Total OUT: 750 mL    Total NET: -50 mL          LABS:                        10.5   8.91  )-----------( 356      ( 16 Nov 2021 05:59 )             33.4     11-16    140  |  104  |  9   ----------------------------<  143<H>  4.0   |  27  |  0.97    Ca    8.7      16 Nov 2021 05:59      PT/INR - ( 15 Nov 2021 06:13 )   PT: 13.3 sec;   INR: 1.11          PTT - ( 15 Nov 2021 06:13 )  PTT:37.1 sec      MEDICATIONS:  ceFAZolin   IVPB 2000 milliGRAM(s) IV Intermittent every 8 hours    acetaminophen     Tablet .. 650 milliGRAM(s) Oral every 6 hours PRN  diphenhydrAMINE 25 milliGRAM(s) Oral every 4 hours PRN  HYDROmorphone  Injectable 0.5 milliGRAM(s) IV Push every 15 minutes PRN  HYDROmorphone  Injectable 0.5 milliGRAM(s) IV Push every 4 hours PRN  ondansetron Injectable 4 milliGRAM(s) IV Push every 4 hours PRN  oxyCODONE    IR 10 milliGRAM(s) Oral every 4 hours PRN  oxyCODONE    IR 5 milliGRAM(s) Oral every 4 hours PRN          ASSESSMENT AND PLAN: 20yo Male s/p Right knee I&D ON 11/11, repeat I&D with hardware removal on 11/15 s/p  right knee ACL reconstruction and infection.        1. Analgesic pain control  2. DVT prophylaxis:   SCDs       3. Weight Bearing Status:  Weight bearing as tolerated      4. Disposition: Home    5. ID recs- +MSSA infection, changed from nafcillin to ancef 2g q8h x 6 weeks.   6. Plan for OR on 11/18 with Dr. Ceballos for right knee I&D, closure with local flaps, possible gastrocnemius muscle flap and skin graft. Likely contact dermatitis for rash. Must get coverage [place] and will travel back home to [place] for 2nd half of abx for home infusions ( aware).   7. Picc line inserted today for 6w abx

## 2021-11-16 NOTE — PROGRESS NOTE ADULT - SUBJECTIVE AND OBJECTIVE BOX
INFECTIOUS DISEASES CONSULT FOLLOW-UP NOTE    INTERVAL HPI/OVERNIGHT EVENTS:  No event overnight  feels well, R arm rash almost resolved after cortisone applicatoin  R knee pain well controlled     ROS:   Constitutional, eyes, ENT, cardiovascular, respiratory, gastrointestinal, genitourinary, integumentary, neurological, psychiatric and heme/lymph are otherwise negative other than noted above       ANTIBIOTICS/RELEVANT:    MEDICATIONS  (STANDING):  ceFAZolin   IVPB 2000 milliGRAM(s) IV Intermittent every 8 hours  folic acid 1 milliGRAM(s) Oral daily  hydrocortisone 2.5% Ointment 1 Application(s) Topical every 12 hours  lactated ringers. 1000 milliLiter(s) (110 mL/Hr) IV Continuous <Continuous>  multivitamin 1 Tablet(s) Oral daily  pantoprazole    Tablet 40 milliGRAM(s) Oral before breakfast  polyethylene glycol 3350 17 Gram(s) Oral at bedtime  senna 2 Tablet(s) Oral at bedtime    MEDICATIONS  (PRN):  acetaminophen     Tablet .. 650 milliGRAM(s) Oral every 6 hours PRN Temp greater or equal to 38C (100.4F), Mild Pain (1 - 3)  bisacodyl Suppository 10 milliGRAM(s) Rectal once PRN Constipation  diphenhydrAMINE 25 milliGRAM(s) Oral every 4 hours PRN Rash and/or Itching  HYDROmorphone  Injectable 0.5 milliGRAM(s) IV Push every 15 minutes PRN pacu  HYDROmorphone  Injectable 0.5 milliGRAM(s) IV Push every 4 hours PRN breakthrough  magnesium hydroxide Suspension 30 milliLiter(s) Oral daily PRN Constipation  ondansetron Injectable 4 milliGRAM(s) IV Push every 4 hours PRN Nausea and/or Vomiting  oxyCODONE    IR 10 milliGRAM(s) Oral every 4 hours PRN Severe Pain (7 - 10)  oxyCODONE    IR 5 milliGRAM(s) Oral every 4 hours PRN Moderate Pain (4 - 6)        Vital Signs Last 24 Hrs  T(C): 36.9 (16 Nov 2021 11:49), Max: 36.9 (16 Nov 2021 08:47)  T(F): 98.4 (16 Nov 2021 11:49), Max: 98.4 (16 Nov 2021 08:47)  HR: 98 (16 Nov 2021 11:49) (66 - 108)  BP: 111/69 (16 Nov 2021 11:49) (102/55 - 138/85)  BP(mean): 104 (15 Nov 2021 22:54) (89 - 104)  RR: 18 (16 Nov 2021 11:49) (13 - 19)  SpO2: 96% (16 Nov 2021 11:49) (96% - 100%)    11-15-21 @ 07:01  -  11-16-21 @ 07:00  --------------------------------------------------------  IN: 2795 mL / OUT: 1325 mL / NET: 1470 mL    11-16-21 @ 07:01  -  11-16-21 @ 12:02  --------------------------------------------------------  IN: 360 mL / OUT: 750 mL / NET: -390 mL      PHYSICAL EXAM:  Constitutional: alert, NAD  Eyes: the sclera and conjunctiva were normal.   ENT: the ears and nose were normal in appearance.   Neck: the appearance of the neck was normal and the neck was supple.   Pulmonary: no respiratory distress and lungs were clear to auscultation bilaterally.   Heart: heart rate was normal and rhythm regular, normal S1 and S2  Ext: R knee wrapped with ACE, photo reviewed - surgical wound with suture placed   Abdomen: normal bowel sounds, soft, non-tender  Neurological: no focal deficits.   Psychiatric: the affect was normal  Derm: rash surrounding R knee         LABS:                        10.5   8.91  )-----------( 356      ( 16 Nov 2021 05:59 )             33.4     11-16    140  |  104  |  9   ----------------------------<  143<H>  4.0   |  27  |  0.97    Ca    8.7      16 Nov 2021 05:59      PT/INR - ( 15 Nov 2021 06:13 )   PT: 13.3 sec;   INR: 1.11          PTT - ( 15 Nov 2021 06:13 )  PTT:37.1 sec      MICROBIOLOGY:  11/16 R knee WCx #1: ngtd  11/16 R knee WCx #2: ngtd   11/12 R tibia fungal Cx:p  11/11 R tibia WCx: MSSA (S to cefaz, clinda, linezo, ox, rifam, bact, vanco)    RADIOLOGY & ADDITIONAL STUDIES:  MRI knee 11/13:   Impression:  Status post ACL reconstruction. Septic arthritis/osteomyelitis as above.  Horizontal tear at the posterior horn of the lateral meniscus.

## 2021-11-17 ENCOUNTER — TRANSCRIPTION ENCOUNTER (OUTPATIENT)
Age: 21
End: 2021-11-17

## 2021-11-17 LAB
ANION GAP SERPL CALC-SCNC: 10 MMOL/L — SIGNIFICANT CHANGE UP (ref 5–17)
BUN SERPL-MCNC: 12 MG/DL — SIGNIFICANT CHANGE UP (ref 7–23)
CALCIUM SERPL-MCNC: 9.4 MG/DL — SIGNIFICANT CHANGE UP (ref 8.4–10.5)
CHLORIDE SERPL-SCNC: 104 MMOL/L — SIGNIFICANT CHANGE UP (ref 96–108)
CO2 SERPL-SCNC: 26 MMOL/L — SIGNIFICANT CHANGE UP (ref 22–31)
CREAT SERPL-MCNC: 1.01 MG/DL — SIGNIFICANT CHANGE UP (ref 0.5–1.3)
CRP SERPL-MCNC: 16.6 MG/L — HIGH (ref 0–4)
ERYTHROCYTE [SEDIMENTATION RATE] IN BLOOD: 55 MM/HR — HIGH
GLUCOSE SERPL-MCNC: 101 MG/DL — HIGH (ref 70–99)
HCT VFR BLD CALC: 34.3 % — LOW (ref 39–50)
HGB BLD-MCNC: 10.3 G/DL — LOW (ref 13–17)
MCHC RBC-ENTMCNC: 27.5 PG — SIGNIFICANT CHANGE UP (ref 27–34)
MCHC RBC-ENTMCNC: 30 GM/DL — LOW (ref 32–36)
MCV RBC AUTO: 91.5 FL — SIGNIFICANT CHANGE UP (ref 80–100)
NRBC # BLD: 0 /100 WBCS — SIGNIFICANT CHANGE UP (ref 0–0)
PLATELET # BLD AUTO: 334 K/UL — SIGNIFICANT CHANGE UP (ref 150–400)
POTASSIUM SERPL-MCNC: 4.3 MMOL/L — SIGNIFICANT CHANGE UP (ref 3.5–5.3)
POTASSIUM SERPL-SCNC: 4.3 MMOL/L — SIGNIFICANT CHANGE UP (ref 3.5–5.3)
RBC # BLD: 3.75 M/UL — LOW (ref 4.2–5.8)
RBC # FLD: 12.6 % — SIGNIFICANT CHANGE UP (ref 10.3–14.5)
SODIUM SERPL-SCNC: 140 MMOL/L — SIGNIFICANT CHANGE UP (ref 135–145)
SURGICAL PATHOLOGY STUDY: SIGNIFICANT CHANGE UP
WBC # BLD: 6.14 K/UL — SIGNIFICANT CHANGE UP (ref 3.8–10.5)
WBC # FLD AUTO: 6.14 K/UL — SIGNIFICANT CHANGE UP (ref 3.8–10.5)

## 2021-11-17 PROCEDURE — 99232 SBSQ HOSP IP/OBS MODERATE 35: CPT

## 2021-11-17 RX ORDER — SIMETHICONE 80 MG/1
80 TABLET, CHEWABLE ORAL DAILY
Refills: 0 | Status: DISCONTINUED | OUTPATIENT
Start: 2021-11-17 | End: 2021-11-23

## 2021-11-17 RX ADMIN — CHLORHEXIDINE GLUCONATE 1 APPLICATION(S): 213 SOLUTION TOPICAL at 05:29

## 2021-11-17 RX ADMIN — Medication 1 TABLET(S): at 11:56

## 2021-11-17 RX ADMIN — Medication 100 MILLIGRAM(S): at 19:05

## 2021-11-17 RX ADMIN — Medication 100 MILLIGRAM(S): at 11:57

## 2021-11-17 RX ADMIN — Medication 100 MILLIGRAM(S): at 05:07

## 2021-11-17 RX ADMIN — Medication 1 APPLICATION(S): at 05:29

## 2021-11-17 RX ADMIN — Medication 1 MILLIGRAM(S): at 11:57

## 2021-11-17 RX ADMIN — Medication 1 APPLICATION(S): at 18:22

## 2021-11-17 RX ADMIN — PANTOPRAZOLE SODIUM 40 MILLIGRAM(S): 20 TABLET, DELAYED RELEASE ORAL at 07:09

## 2021-11-17 NOTE — PROGRESS NOTE ADULT - SUBJECTIVE AND OBJECTIVE BOX
INFECTIOUS DISEASES CONSULT FOLLOW-UP NOTE    INTERVAL HPI/OVERNIGHT EVENTS:  No event overnight  patient feels well  rash resolving     ROS:   Constitutional, eyes, ENT, cardiovascular, respiratory, gastrointestinal, genitourinary, integumentary, neurological, psychiatric and heme/lymph are otherwise negative other than noted above       ANTIBIOTICS/RELEVANT:    MEDICATIONS  (STANDING):  ceFAZolin   IVPB 2000 milliGRAM(s) IV Intermittent every 8 hours  chlorhexidine 2% Cloths 1 Application(s) Topical <User Schedule>  folic acid 1 milliGRAM(s) Oral daily  hydrocortisone 2.5% Ointment 1 Application(s) Topical every 12 hours  lactated ringers. 1000 milliLiter(s) (110 mL/Hr) IV Continuous <Continuous>  lactated ringers. 1000 milliLiter(s) (120 mL/Hr) IV Continuous <Continuous>  multivitamin 1 Tablet(s) Oral daily  pantoprazole    Tablet 40 milliGRAM(s) Oral before breakfast  polyethylene glycol 3350 17 Gram(s) Oral at bedtime  senna 2 Tablet(s) Oral at bedtime    MEDICATIONS  (PRN):  acetaminophen     Tablet .. 650 milliGRAM(s) Oral every 6 hours PRN Temp greater or equal to 38C (100.4F), Mild Pain (1 - 3)  bisacodyl Suppository 10 milliGRAM(s) Rectal once PRN Constipation  diphenhydrAMINE 25 milliGRAM(s) Oral every 4 hours PRN Rash and/or Itching  HYDROmorphone  Injectable 0.5 milliGRAM(s) IV Push every 15 minutes PRN pacu  HYDROmorphone  Injectable 0.5 milliGRAM(s) IV Push every 4 hours PRN breakthrough  magnesium hydroxide Suspension 30 milliLiter(s) Oral daily PRN Constipation  ondansetron Injectable 4 milliGRAM(s) IV Push every 4 hours PRN Nausea and/or Vomiting  oxyCODONE    IR 10 milliGRAM(s) Oral every 4 hours PRN Severe Pain (7 - 10)  oxyCODONE    IR 5 milliGRAM(s) Oral every 4 hours PRN Moderate Pain (4 - 6)  simethicone 80 milliGRAM(s) Chew daily PRN Indigestion  sodium chloride 0.9% lock flush 10 milliLiter(s) IV Push every 1 hour PRN Pre/post blood products, medications, blood draw, and to maintain line patency        Vital Signs Last 24 Hrs  T(C): 36.6 (17 Nov 2021 09:26), Max: 37.1 (16 Nov 2021 20:20)  T(F): 97.9 (17 Nov 2021 09:26), Max: 98.7 (16 Nov 2021 20:20)  HR: 93 (17 Nov 2021 09:26) (91 - 105)  BP: 118/76 (17 Nov 2021 09:26) (104/69 - 124/70)  BP(mean): --  RR: 18 (17 Nov 2021 09:26) (16 - 18)  SpO2: 100% (17 Nov 2021 09:26) (96% - 100%)    11-16-21 @ 07:01  -  11-17-21 @ 07:00  --------------------------------------------------------  IN: 1050 mL / OUT: 1175 mL / NET: -125 mL    11-17-21 @ 07:01  -  11-17-21 @ 11:39  --------------------------------------------------------  IN: 0 mL / OUT: 300 mL / NET: -300 mL      PHYSICAL EXAM:  Constitutional: alert, NAD  Eyes: the sclera and conjunctiva were normal.   ENT: the ears and nose were normal in appearance.   Neck: the appearance of the neck was normal and the neck was supple.   Pulmonary: no respiratory distress and lungs were clear to auscultation bilaterally.   Heart: heart rate was normal and rhythm regular, normal S1 and S2  Ext: R knee wrapped with ACE, minimal surrounding rash   Abdomen: normal bowel sounds, soft, non-tender  Neurological: no focal deficits.   Psychiatric: the affect was normal        LABS:                        10.3   6.14  )-----------( 334      ( 17 Nov 2021 07:23 )             34.3     11-17    140  |  104  |  12  ----------------------------<  101<H>  4.3   |  26  |  1.01    Ca    9.4      17 Nov 2021 07:23            MICROBIOLOGY:  11/16 R knee WCx #1: ngtd  11/16 R knee WCx #2: ngtd   11/12 R tibia fungal Cx:p  11/11 R tibia WCx: MSSA (S to cefaz, clinda, linezo, ox, rifam, bact, vanco)      RADIOLOGY & ADDITIONAL STUDIES:  MRI knee 11/13:   Impression:  Status post ACL reconstruction. Septic arthritis/osteomyelitis as above.  Horizontal tear at the posterior horn of the lateral meniscus.

## 2021-11-17 NOTE — PROGRESS NOTE ADULT - SUBJECTIVE AND OBJECTIVE BOX
21M h/o right ACL reconstruction in 2014, with subsequent revisions in 2016 and July 2021, now c/b MSSA infection s/p I&D x 2 in 7/2021 s/p cefazolin course p/w MSSA septic arthritis/OM of right knee.      Pt is now POD#6 and POD#2  I&D , removal of hardware  Plan for plastic wound closure on 11/18. Patient seen and examined.  Pt without complaints.  Denies chest pain/SOB/dizziness/n/v/HA. Notes he had chest discomfort with inspiration yesterday that was worked up with negative EKG and has resolved.  Pain well controlled.   Denies fevers/chills.     OBJECTIVE:     Vital Signs Last 24 Hrs  T(C): 36.6 (17 Nov 2021 12:19), Max: 37.1 (16 Nov 2021 20:20)  T(F): 97.8 (17 Nov 2021 12:19), Max: 98.7 (16 Nov 2021 20:20)  HR: 90 (17 Nov 2021 12:19) (90 - 105)  BP: 122/67 (17 Nov 2021 12:19) (104/69 - 124/70)  BP(mean): --  RR: 18 (17 Nov 2021 12:19) (16 - 18)  SpO2: 99% (17 Nov 2021 12:19) (97% - 100%)    PE: Comfortable in bed. NAD. alert, oriented, pleasant. Afebrile.          Dressing: clean/dry/intact - ace wrap RLE. No erythema or redness or warmth noted to RLE. Negative alejandro BLE.          Sensation: intact to light touch to patient's baseline BLE           Motor exam:  firing ehl/ta/gs/fhl 5/5 BLE           Skin warm, well-perfused; capillary refill brisk BLE              LABS:                        10.3   6.14  )-----------( 334      ( 17 Nov 2021 07:23 )             34.3     11-17    140  |  104  |  12  ----------------------------<  101<H>  4.3   |  26  |  1.01    Ca    9.4      17 Nov 2021 07:23      ASSESSMENT AND PLAN: 21M h/o right ACL reconstruction in 2014, with subsequent revisions in 2016 and July 2021, now c/b MSSA infection s/p I&D x 2 in 7/2021 s/p cefazolin course p/w MSSA septic arthritis/OM of right knee. Pt is now POD#6 and POD#2  I&D , removal of hardware  1. Stable. Labs reviewed. Received PICC line yesterday. Antibiotics switched to Ancef.   - Preop for OR tomorrow   - NPO after midnight  - Consent in chart   - Will follow up schedule for OR time   2. Analgesic pain control  3. DVT prophylaxis: SCDs , preop for OR tomorrow   4. Weight Bearing Status:  WBAT RLE   5. Disposition:  Pending OR tomorrow, has PICC, will need IV infusion services set up for [facility] and [facility] per ID.

## 2021-11-17 NOTE — PROGRESS NOTE ADULT - SUBJECTIVE AND OBJECTIVE BOX
Ortho Note    Pt seen and examined on morning rounds. Pt comfortable without complaints, pain controlled. PICC line placed yesterday. Pending RTOR 11/18 for repeat R knee washout and possible flap closure by plastics.  Denies CP, SOB, N/V, numbness/tingling     Vital Signs Last 24 Hrs  T(C): --  T(F): --  HR: --  BP: --  BP(mean): --  RR: --  SpO2: --  I&O's Summary    15 Nov 2021 07:01  -  16 Nov 2021 07:00  --------------------------------------------------------  IN: 2795 mL / OUT: 1325 mL / NET: 1470 mL    16 Nov 2021 07:01  -  17 Nov 2021 04:02  --------------------------------------------------------  IN: 1000 mL / OUT: 1000 mL / NET: 0 mL      Physical Exam:  General: Pt Alert and oriented, NAD  DSG C/D/I Right knee  Rash to RLE and RUE improving (pt endorsing improvement as well)  Pulses intact RLE  Sensation intact RLE  Motor: EHL/FHL/TA/GS 5/5 RLE                          10.5   8.91  )-----------( 356      ( 16 Nov 2021 05:59 )             33.4     11-16    140  |  104  |  9   ----------------------------<  143<H>  4.0   |  27  |  0.97    Ca    8.7      16 Nov 2021 05:59        A/P: 21yMale s/p Right knee washout x2 11/11, 11/15 with Brown 11/15, pending return to OR for repeat R knee washout and possible flap closure by plastics on 11/18.  - Stable  - ID recs appreciated  - Abx: nafcillin switched to standing Ancef per ID recs  - OR cultures from 11/11 growing S. aureus, cont to f/u OR cultures  - Pain Control  - PICC placed yesterday  - NPO after midnight for OR tomorrow  - DVT ppx: scds  - WBS: WBAT  - benadryl/hydrocortisone to rash  - Dispo: pending final ID recs    Jerry Serrato, PGY-1  Ortho Pager [phone] Ortho Note    Pt seen and examined on morning rounds. Pt comfortable without complaints, pain controlled. PICC line placed yesterday. Pending RTOR 11/18 for repeat R knee washout and possible flap closure by plastics.  Denies CP, SOB, N/V, numbness/tingling     Vital Signs Last 24 Hrs  T(C): --  T(F): --  HR: --  BP: --  BP(mean): --  RR: --  SpO2: --  I&O's Summary    15 Nov 2021 07:01  -  16 Nov 2021 07:00  --------------------------------------------------------  IN: 2795 mL / OUT: 1325 mL / NET: 1470 mL    16 Nov 2021 07:01  -  17 Nov 2021 04:02  --------------------------------------------------------  IN: 1000 mL / OUT: 1000 mL / NET: 0 mL      Physical Exam:  General: Pt Alert and oriented, NAD  DSG C/D/I Right knee  Rash to RLE and RUE improving (pt endorsing improvement as well)  Pulses intact RLE  Sensation intact RLE  Motor: EHL/FHL/TA/GS 5/5 RLE                          10.5   8.91  )-----------( 356      ( 16 Nov 2021 05:59 )             33.4     11-16    140  |  104  |  9   ----------------------------<  143<H>  4.0   |  27  |  0.97    Ca    8.7      16 Nov 2021 05:59        A/P: 21yMale s/p Right knee washout x2 11/11, 11/15 with Brown 11/15, pending return to OR for repeat R knee washout and possible flap closure by plastics on 11/18.  - Stable  - ID recs appreciated  - Abx: nafcillin switched to standing Ancef per ID recs  - OR cultures from 11/11 growing S. aureus, cont to f/u OR cultures  - Pain Control  - PICC placed yesterday  - NPO after midnight for OR tomorrow  - DVT ppx: scds  - WBS: WBAT  - benadryl/hydrocortisone to rash  - Dispo: pending final ID recs    Jerry Serrato, PGY-1  Ortho Pager [phone]

## 2021-11-18 ENCOUNTER — APPOINTMENT (OUTPATIENT)
Dept: ORTHOPEDIC SURGERY | Facility: HOSPITAL | Age: 21
End: 2021-11-18
Payer: COMMERCIAL

## 2021-11-18 LAB
-  CEFAZOLIN: SIGNIFICANT CHANGE UP
-  CLINDAMYCIN: SIGNIFICANT CHANGE UP
-  ERYTHROMYCIN: SIGNIFICANT CHANGE UP
-  LINEZOLID: SIGNIFICANT CHANGE UP
-  OXACILLIN: SIGNIFICANT CHANGE UP
-  RIFAMPIN: SIGNIFICANT CHANGE UP
-  TRIMETHOPRIM/SULFAMETHOXAZOLE: SIGNIFICANT CHANGE UP
-  VANCOMYCIN: SIGNIFICANT CHANGE UP
ANION GAP SERPL CALC-SCNC: 9 MMOL/L — SIGNIFICANT CHANGE UP (ref 5–17)
BUN SERPL-MCNC: 12 MG/DL — SIGNIFICANT CHANGE UP (ref 7–23)
CALCIUM SERPL-MCNC: 9.4 MG/DL — SIGNIFICANT CHANGE UP (ref 8.4–10.5)
CHLORIDE SERPL-SCNC: 104 MMOL/L — SIGNIFICANT CHANGE UP (ref 96–108)
CO2 SERPL-SCNC: 26 MMOL/L — SIGNIFICANT CHANGE UP (ref 22–31)
CREAT SERPL-MCNC: 1.01 MG/DL — SIGNIFICANT CHANGE UP (ref 0.5–1.3)
CULTURE RESULTS: SIGNIFICANT CHANGE UP
CULTURE RESULTS: SIGNIFICANT CHANGE UP
GLUCOSE SERPL-MCNC: 103 MG/DL — HIGH (ref 70–99)
GRAM STN FLD: SIGNIFICANT CHANGE UP
GRAM STN FLD: SIGNIFICANT CHANGE UP
HCT VFR BLD CALC: 33.3 % — LOW (ref 39–50)
HGB BLD-MCNC: 10.5 G/DL — LOW (ref 13–17)
MCHC RBC-ENTMCNC: 28.3 PG — SIGNIFICANT CHANGE UP (ref 27–34)
MCHC RBC-ENTMCNC: 31.5 GM/DL — LOW (ref 32–36)
MCV RBC AUTO: 89.8 FL — SIGNIFICANT CHANGE UP (ref 80–100)
METHOD TYPE: SIGNIFICANT CHANGE UP
NRBC # BLD: 0 /100 WBCS — SIGNIFICANT CHANGE UP (ref 0–0)
ORGANISM # SPEC MICROSCOPIC CNT: SIGNIFICANT CHANGE UP
ORGANISM # SPEC MICROSCOPIC CNT: SIGNIFICANT CHANGE UP
PLATELET # BLD AUTO: 333 K/UL — SIGNIFICANT CHANGE UP (ref 150–400)
POTASSIUM SERPL-MCNC: 4 MMOL/L — SIGNIFICANT CHANGE UP (ref 3.5–5.3)
POTASSIUM SERPL-SCNC: 4 MMOL/L — SIGNIFICANT CHANGE UP (ref 3.5–5.3)
RBC # BLD: 3.71 M/UL — LOW (ref 4.2–5.8)
RBC # FLD: 12.6 % — SIGNIFICANT CHANGE UP (ref 10.3–14.5)
SARS-COV-2 RNA SPEC QL NAA+PROBE: SIGNIFICANT CHANGE UP
SODIUM SERPL-SCNC: 139 MMOL/L — SIGNIFICANT CHANGE UP (ref 135–145)
SPECIMEN SOURCE: SIGNIFICANT CHANGE UP
WBC # BLD: 6.89 K/UL — SIGNIFICANT CHANGE UP (ref 3.8–10.5)
WBC # FLD AUTO: 6.89 K/UL — SIGNIFICANT CHANGE UP (ref 3.8–10.5)

## 2021-11-18 PROCEDURE — 10140 I&D HMTMA SEROMA/FLUID COLLJ: CPT | Mod: 58,RT

## 2021-11-18 PROCEDURE — 99232 SBSQ HOSP IP/OBS MODERATE 35: CPT

## 2021-11-18 RX ORDER — OXYCODONE HYDROCHLORIDE 5 MG/1
10 TABLET ORAL EVERY 4 HOURS
Refills: 0 | Status: DISCONTINUED | OUTPATIENT
Start: 2021-11-18 | End: 2021-11-23

## 2021-11-18 RX ORDER — OXYCODONE HYDROCHLORIDE 5 MG/1
5 TABLET ORAL EVERY 4 HOURS
Refills: 0 | Status: DISCONTINUED | OUTPATIENT
Start: 2021-11-18 | End: 2021-11-23

## 2021-11-18 RX ORDER — HYDROMORPHONE HYDROCHLORIDE 2 MG/ML
0.5 INJECTION INTRAMUSCULAR; INTRAVENOUS; SUBCUTANEOUS EVERY 4 HOURS
Refills: 0 | Status: DISCONTINUED | OUTPATIENT
Start: 2021-11-18 | End: 2021-11-23

## 2021-11-18 RX ORDER — ENOXAPARIN SODIUM 100 MG/ML
40 INJECTION SUBCUTANEOUS EVERY 24 HOURS
Refills: 0 | Status: DISCONTINUED | OUTPATIENT
Start: 2021-11-18 | End: 2021-11-23

## 2021-11-18 RX ADMIN — OXYCODONE HYDROCHLORIDE 5 MILLIGRAM(S): 5 TABLET ORAL at 14:39

## 2021-11-18 RX ADMIN — Medication 1 APPLICATION(S): at 17:39

## 2021-11-18 RX ADMIN — CHLORHEXIDINE GLUCONATE 1 APPLICATION(S): 213 SOLUTION TOPICAL at 07:27

## 2021-11-18 RX ADMIN — Medication 100 MILLIGRAM(S): at 21:18

## 2021-11-18 RX ADMIN — OXYCODONE HYDROCHLORIDE 5 MILLIGRAM(S): 5 TABLET ORAL at 15:30

## 2021-11-18 RX ADMIN — OXYCODONE HYDROCHLORIDE 10 MILLIGRAM(S): 5 TABLET ORAL at 18:44

## 2021-11-18 RX ADMIN — OXYCODONE HYDROCHLORIDE 5 MILLIGRAM(S): 5 TABLET ORAL at 12:39

## 2021-11-18 RX ADMIN — OXYCODONE HYDROCHLORIDE 5 MILLIGRAM(S): 5 TABLET ORAL at 12:43

## 2021-11-18 RX ADMIN — ENOXAPARIN SODIUM 40 MILLIGRAM(S): 100 INJECTION SUBCUTANEOUS at 17:39

## 2021-11-18 RX ADMIN — HYDROMORPHONE HYDROCHLORIDE 0.5 MILLIGRAM(S): 2 INJECTION INTRAMUSCULAR; INTRAVENOUS; SUBCUTANEOUS at 11:50

## 2021-11-18 RX ADMIN — Medication 100 MILLIGRAM(S): at 03:28

## 2021-11-18 RX ADMIN — OXYCODONE HYDROCHLORIDE 10 MILLIGRAM(S): 5 TABLET ORAL at 23:44

## 2021-11-18 RX ADMIN — Medication 100 MILLIGRAM(S): at 12:29

## 2021-11-18 RX ADMIN — OXYCODONE HYDROCHLORIDE 10 MILLIGRAM(S): 5 TABLET ORAL at 19:30

## 2021-11-18 RX ADMIN — OXYCODONE HYDROCHLORIDE 10 MILLIGRAM(S): 5 TABLET ORAL at 22:44

## 2021-11-18 RX ADMIN — HYDROMORPHONE HYDROCHLORIDE 0.5 MILLIGRAM(S): 2 INJECTION INTRAMUSCULAR; INTRAVENOUS; SUBCUTANEOUS at 12:00

## 2021-11-18 NOTE — CHART NOTE - NSCHARTNOTEFT_GEN_A_CORE
Admitting Diagnosis:   Patient is a 21y old  Male who presents with a chief complaint of RIGHT knee/leg infection (14 Nov 2021 18:55)    PAST MEDICAL & SURGICAL HISTORY:    Current Nutrition Order: NPO     PO Intake: Good (%) [   ]  Fair (50-75%) [   ] Poor (<25%) [   ] - NPO     GI Issues:   Per flowsheet: WDL with last BM 11/16     Pain:  Per flowsheet: denies pain/discomfort     Skin Integrity:  Per flowsheet: Ch[place] 20, surgical incision on R knee, skin intact, no edema documented     Labs:   11-18    139  |  104  |  12  ----------------------------<  103<H>  4.0   |  26  |  1.01    Ca    9.4      18 Nov 2021 05:46    CAPILLARY BLOOD GLUCOSE  Medications:  MEDICATIONS  (STANDING):  ceFAZolin   IVPB 2000 milliGRAM(s) IV Intermittent every 8 hours  chlorhexidine 2% Cloths 1 Application(s) Topical <User Schedule>  folic acid 1 milliGRAM(s) Oral daily  hydrocortisone 2.5% Ointment 1 Application(s) Topical every 12 hours  lactated ringers. 1000 milliLiter(s) (110 mL/Hr) IV Continuous <Continuous>  lactated ringers. 1000 milliLiter(s) (120 mL/Hr) IV Continuous <Continuous>  multivitamin 1 Tablet(s) Oral daily  pantoprazole    Tablet 40 milliGRAM(s) Oral before breakfast  polyethylene glycol 3350 17 Gram(s) Oral at bedtime  senna 2 Tablet(s) Oral at bedtime    MEDICATIONS  (PRN):  acetaminophen     Tablet .. 650 milliGRAM(s) Oral every 6 hours PRN Temp greater or equal to 38C (100.4F), Mild Pain (1 - 3)  bisacodyl Suppository 10 milliGRAM(s) Rectal once PRN Constipation  diphenhydrAMINE 25 milliGRAM(s) Oral every 4 hours PRN Rash and/or Itching  HYDROmorphone  Injectable 0.5 milliGRAM(s) IV Push every 15 minutes PRN pacu  HYDROmorphone  Injectable 0.5 milliGRAM(s) IV Push every 4 hours PRN breakthrough  magnesium hydroxide Suspension 30 milliLiter(s) Oral daily PRN Constipation  ondansetron Injectable 4 milliGRAM(s) IV Push every 4 hours PRN Nausea and/or Vomiting  oxyCODONE    IR 10 milliGRAM(s) Oral every 4 hours PRN Severe Pain (7 - 10)  oxyCODONE    IR 5 milliGRAM(s) Oral every 4 hours PRN Moderate Pain (4 - 6)  simethicone 80 milliGRAM(s) Chew daily PRN Indigestion  sodium chloride 0.9% lock flush 10 milliLiter(s) IV Push every 1 hour PRN Pre/post blood products, medications, blood draw, and to maintain line patency    ADMITTING ANTHROPOMETRICS   Weight: 72.6kg (160.1#)  Height: 177.8cm (5'10")   BMI: 23  IBW: 75.5kg (166#) - 96.2%    Weight Change: No new weight documented during follow-up    Estimated energy needs:   Used ABW to estimate requirements as %IBW % (96.2%), adjusted for age and healing  Energy (30cal/kg): 2178cal/day   Protein (1.2-1.5g/kg): 87..9g/day   Fluid (30-35mL/kg): 2175-2537mL/day     Subjective:   22 y/o male s/p R ACL reconstruction in 2014 with subsequent revisions and recent infection s/p R knee washout in July 2021, was on IV abx x4 weeks. Noticed drainage from R knee which worsened over the last 2 days before admission, p/w MSSA septic arthritis and OM of R knee. During admission, s/p I&D of R knee joint, removal of foreign body 11/12, s/p R knee washout x2 11/11 and 11/15. Return to OR for repear R knee washout and possible wound closure by plastics today 11/18.    Attempted to see pt, however, was in OR. Unable to find RN for more information. Please see below for nutrition recommendations.     Previous Nutrition Diagnosis: Increased energy and protein needs RT infection and hypermetabolic AEB 30cal/kg of energy and 1.2-1.5g protein/kg requirements of ABW.     Active [ x ]  Resolved [   ]    If resolved, new PES:     Goal: Pt to meet >75% of nutrient needs via PO intake.    Recommendations:  1. Provide regular diet with Ensure Max BID and LPS once daily when medically feasible   2. Continue to monitor labs, skin integrity, weight, intake, diet tolerance and GI distress  3. Provide additional education as needed     Education:     Risk Level: High [   ] Moderate [ x ] Low [   ] Admitting Diagnosis:   Patient is a 21y old  Male who presents with a chief complaint of RIGHT knee/leg infection (14 Nov 2021 18:55)    PAST MEDICAL & SURGICAL HISTORY:    Current Nutrition Order: NPO   >> NPO for OR repeat R knee washout and possible wound closure by plastics today 11/18.  >> Was on regular diet before     PO Intake: Good (%) [   ]  Fair (50-75%) [   ] Poor (<25%) [   ] - NPO     GI Issues:   Per flowsheet: WDL with last BM 11/16     Pain:  Per flowsheet: denies pain/discomfort     Skin Integrity:  Per flowsheet: Chago 20, surgical incision on R knee, skin intact, no edema documented     Labs:   11-18    139  |  104  |  12  ----------------------------<  103<H>  4.0   |  26  |  1.01    Ca    9.4      18 Nov 2021 05:46    CAPILLARY BLOOD GLUCOSE  Medications:  MEDICATIONS  (STANDING):  ceFAZolin   IVPB 2000 milliGRAM(s) IV Intermittent every 8 hours  chlorhexidine 2% Cloths 1 Application(s) Topical <User Schedule>  folic acid 1 milliGRAM(s) Oral daily  hydrocortisone 2.5% Ointment 1 Application(s) Topical every 12 hours  lactated ringers. 1000 milliLiter(s) (110 mL/Hr) IV Continuous <Continuous>  lactated ringers. 1000 milliLiter(s) (120 mL/Hr) IV Continuous <Continuous>  multivitamin 1 Tablet(s) Oral daily  pantoprazole    Tablet 40 milliGRAM(s) Oral before breakfast  polyethylene glycol 3350 17 Gram(s) Oral at bedtime  senna 2 Tablet(s) Oral at bedtime    MEDICATIONS  (PRN):  acetaminophen     Tablet .. 650 milliGRAM(s) Oral every 6 hours PRN Temp greater or equal to 38C (100.4F), Mild Pain (1 - 3)  bisacodyl Suppository 10 milliGRAM(s) Rectal once PRN Constipation  diphenhydrAMINE 25 milliGRAM(s) Oral every 4 hours PRN Rash and/or Itching  HYDROmorphone  Injectable 0.5 milliGRAM(s) IV Push every 15 minutes PRN pacu  HYDROmorphone  Injectable 0.5 milliGRAM(s) IV Push every 4 hours PRN breakthrough  magnesium hydroxide Suspension 30 milliLiter(s) Oral daily PRN Constipation  ondansetron Injectable 4 milliGRAM(s) IV Push every 4 hours PRN Nausea and/or Vomiting  oxyCODONE    IR 10 milliGRAM(s) Oral every 4 hours PRN Severe Pain (7 - 10)  oxyCODONE    IR 5 milliGRAM(s) Oral every 4 hours PRN Moderate Pain (4 - 6)  simethicone 80 milliGRAM(s) Chew daily PRN Indigestion  sodium chloride 0.9% lock flush 10 milliLiter(s) IV Push every 1 hour PRN Pre/post blood products, medications, blood draw, and to maintain line patency    ADMITTING ANTHROPOMETRICS   Weight: 72.6kg (160.1#)  Height: 177.8cm (5'10")   BMI: 23  IBW: 75.5kg (166#) - 96.2%    Weight Change: No new weight documented during follow-up    Estimated energy needs:   Used ABW to estimate requirements as %IBW % (96.2%), adjusted for age and healing  Energy (30cal/kg): 2178cal/day   Protein (1.2-1.5g/kg): 87..9g/day   Fluid (30-35mL/kg): 2175-2537mL/day     Subjective:   20 y/o male s/p R ACL reconstruction in 2014 with subsequent revisions and recent infection s/p R knee washout in July 2021, was on IV abx x4 weeks. Noticed drainage from R knee which worsened over the last 2 days before admission, p/w MSSA septic arthritis and OM of R knee. During admission, s/p I&D of R knee joint, removal of foreign body 11/12, s/p R knee washout x2 11/11 and 11/15. Return to OR for repeat R knee washout and possible wound closure by plastics today 11/18.    Attempted to see pt, however, was in OR. RN could not provide more information as today is her first day with pt. Please see below for nutrition recommendations.     Previous Nutrition Diagnosis: Increased energy and protein needs RT infection and hypermetabolic AEB 30cal/kg of energy and 1.2-1.5g protein/kg requirements of ABW.     Active [ x ]  Resolved [   ]    If resolved, new PES:     Goal: Pt to meet >75% of nutrient needs via PO intake.    Recommendations:  1. Provide regular diet with Ensure Max BID and LPS once daily when medically feasible   2. Continue to monitor labs, skin integrity, weight, intake, diet tolerance and GI distress  3. Provide additional education as needed     Education:     Risk Level: High [   ] Moderate [ x ] Low [   ]

## 2021-11-18 NOTE — BRIEF OPERATIVE NOTE - NSICDXBRIEFPROCEDURE_GEN_ALL_CORE_FT
PROCEDURES:  Irrigation and debridement, joint, knee 12-Nov-2021 13:23:52 I+D right knee, removal of foreign body, arthroscopy Venita Macario  
PROCEDURES:  Closure using gastrocnemius flap 18-Nov-2021 11:16:50  Jerry Serrato  [place] of skin for graft 18-Nov-2021 11:17:10  Jerry Serrato

## 2021-11-18 NOTE — PROGRESS NOTE ADULT - SUBJECTIVE AND OBJECTIVE BOX
Ortho Note    Surgery: I&D Right knee, plastics closure with graft from right thigh, medial gastroc  PAtient seen and examined at bedside post op   Dad at bedside   Pt endorses pain   Denies CP, SOB, N/V, numbness/tingling     Vital Signs Last 24 Hrs  T(C): 36.7 (11-18-21 @ 13:38), Max: 36.7 (11-18-21 @ 13:01)  T(F): 98.1 (11-18-21 @ 13:38), Max: 98.1 (11-18-21 @ 13:38)  HR: 79 (11-18-21 @ 13:38) (66 - 96)  BP: 115/69 (11-18-21 @ 13:38) (115/69 - 147/75)  BP(mean): 90 (11-18-21 @ 13:01) (86 - 102)  RR: 16 (11-18-21 @ 13:38) (11 - 20)  SpO2: 96% (11-18-21 @ 13:38) (96% - 100%)  AVSS    General: Pt Alert and oriented, NAD  Dressing C/D/I: Gauze/tegaderm right thigh donor site  CHRISTINE drain right medial gastroc site  TYRA ACL incision  KI in place   Pulses: 2+ DP pulse  Sensation: intact to light touch   Motor: EHL/FHL/TA/GS 5/5 RLE         A/P: 21yMale POD#0 s/p I&D R knee #3 with plastic closure with skin/medial gastroc graft    - Stable post op   - Pain Control: IV and PO PRN   - DVT ppx: 40mg subQ daily   - PT, WBS: NWB in KI RLE  - Plastics co management of graft viability monitoring, drains  - dispo: home with home infusion minimum 5 days from OR pending progress/graft viability     Ortho Pager [phone]

## 2021-11-18 NOTE — PROGRESS NOTE ADULT - SUBJECTIVE AND OBJECTIVE BOX
Ortho Note    Pt seen and examined on morning rounds. Pt comfortable without complaints, pain controlled.  Denies CP, SOB, N/V, numbness/tingling     Vital Signs Last 24 Hrs  T(C): 36.7 (11-18-21 @ 05:00), Max: 36.7 (11-18-21 @ 05:00)  T(F): 98 (11-18-21 @ 05:00), Max: 98 (11-18-21 @ 05:00)  HR: 99 (11-18-21 @ 05:00) (99 - 99)  BP: 125/75 (11-18-21 @ 05:00) (125/75 - 125/75)  BP(mean): 92 (11-18-21 @ 05:00) (92 - 92)  RR: 18 (11-18-21 @ 05:00) (18 - 18)  SpO2: 99% (11-18-21 @ 05:00) (99% - 99%)  I&O's Summary    16 Nov 2021 07:01  -  17 Nov 2021 07:00  --------------------------------------------------------  IN: 1050 mL / OUT: 1175 mL / NET: -125 mL    17 Nov 2021 07:01  -  18 Nov 2021 06:57  --------------------------------------------------------  IN: 2500 mL / OUT: 2150 mL / NET: 350 mL        Physical Exam:  General: Pt Alert and oriented, NAD  DSG C/D/I Right knee  Rash to RLE and RUE improving (pt endorsing improvement as well)  Pulses intact RLE  Sensation intact RLE  Motor: EHL/FHL/TA/GS 5/5 RLE                          10.5   6.89  )-----------( 333      ( 18 Nov 2021 05:46 )             33.3     11-18    139  |  104  |  12  ----------------------------<  103<H>  4.0   |  26  |  1.01    Ca    9.4      18 Nov 2021 05:46        A/P: 21yMale s/p Right knee washout x2 11/11, 11/15 with Brown 11/15, pending return to OR for repeat R knee washout and possible flap closure by plastics today 11/18.  - Stable  - ID recs appreciated  - Abx: nafcillin switched to standing Ancef per ID recs  - OR cultures from 11/11 growing S. aureus, cont to f/u OR cultures  - Pain Control  - PICC placed: duration of antibiotics is 6 weeks from the closure ( 11/18 - 12/29 )  - NPO today  - DVT ppx: scds  - WBS: WBAT  - benadryl/hydrocortisone to rash  - Dispo: pending home infusion set up    Jerry Serrato, PGY-1  Ortho Pager [phone]

## 2021-11-18 NOTE — PROGRESS NOTE ADULT - ASSESSMENT
21M h/o R ACL reconstruction in 2014, revision in 2016 and 7/1/21 c/b MSSA infection s/p I&D x 2 in 7/2021 s/p cefazolin x 4 weeks (ended in mid 9/2021) p/w MSSA septic arthritis and OM of R knee.  s/p I&D on 11/11 and 11/15, all screwed removed, now s/p R knee I&D, medial gastrocnemius flap closure with skin graft harvest on 11/18.      Please note that patient is planning to go back to [place] where his family lives in mid December.  Set up OPAT with [employer] which can arrange home abx infusion in [facility] and [employer].    - cont cefazolin 2g IV q8h   - f/u OR culture from 11/17  - Duration of antibiotics is 6 weeks from the closure ( 11/18 - 12/29 )  - Weekly labs: CMP, CBC, ESR, CRP faxed to ID office at [phone]  - Patient to follow up with Dr. durham in 2 weeks ([address] [place] [address], [phone]), ID office will call patient to schedule       Thank you for your consult.  Please re-consult us or call us with questions.  Case d/w primary team.    Virginia Durham MD, MS  Infectious Disease attending  work cell [phone]  For any questions during evening/weekend/holiday, please page ID on call 
21M h/o R ACL reconstruction in 2014, revision in 2016 and 7/1/21 c/b MSSA infection s/p I&D x 2 in 7/2021 s/p cefazolin x 4 weeks (ended in mid 9/2021) p/w MSSA septic arthritis and OM of R knee.  s/p I&D on 11/11 and 11/15, now all screwed removed per Dr. Dasilva.  Plan for plastic wound closure on 11/18.    Please note that patient is planning to go back to [place] where his family lives in mid December.  Set up OPAT with [employer] which can arrange home abx infusion in [facility] and [place].    - cont cefazolin 2g IV q8h   - f/u OR culture from 11/15  - Duration of antibiotics is 6 weeks from the closure ( 11/18 - 12/29 )  - Weekly labs: CMP, CBC, ESR, CRP faxed to ID office at [phone]  - Patient to follow up with Dr. durham in 2 weeks ([address] [place] [address], [phone]), ID office will call patient to schedule       Team 2 will follow you.   Case d/w primary team.    Virginia Durham MD, MS  Infectious Disease attending  work cell [phone]   For any questions during evening/weekend/holiday, please page ID on call     
21M h/o R ACL reconstruction in 2014, revision in 2016 and 7/1/21 c/b MSSA infection s/p I&D x 2 in 7/2021 s/p cefazolin x 4 weeks (ended in mid 9/2021) p/w MSSA septic arthritis and OM of R knee.  s/p I&D on 11/11 and plan for repeat I&D on 11/15.  MRI showed septic arthritis and OM.  He also has retained screws - although these are dissolvable screws, it takes a few years to dissolve and thus they are considered retained prosthetic materials.  He will benefit from rifampin if he continues to have foreign material after tonight's I&D.    - cont nafcillin 2g IV q4h  - if patient still has retained prosthetic materials after tonight's I&D, then will plan for adding rifampin tomorrow  - anticipate at least 6 weeks of IV antibiotics   - please send repeat OR culture today       Team 2 will follow you.   Case d/w primary team.    Virginia Durham MD, MS  Infectious Disease attending  work cell [phone]   For any questions during evening/weekend/holiday, please page ID on call   
IMRPESSION:  21 year old male with history of R ACL reconstruction in 2014, revision in 2016 and 7/1/21 c/b MSSA infection s/p I&D x 2 in 7/2021 s/p cefazolin x 4 weeks (ended in mid 9/2021) p/w recurrent R knee infection.  Per Dr. Dasilva, patient has open wound on R knee which was communicating to R joint space c/b septic arthritis.       Wound culture with MSSA    Recommend:  1.  Can stop Vancomycin  2.  Start Nafcillin 2 grams IV q4hrs  3.  Place single lumen PICC on Monday  4.   f/u OR culture     Team 2 will follow.  I will be covering this weekend.  Dr. Durham returns on 11/15
21M h/o R ACL reconstruction in 2014, revision in 2016 and 7/1/21 c/b MSSA infection s/p I&D x 2 in 7/2021 s/p cefazolin x 4 weeks (ended in mid 9/2021) p/w MSSA septic arthritis and OM of R knee.  s/p I&D on 11/11 and 11/15, now all screwed removed per Dr. Dasilva.  Plan for plastic wound closure on 11/18.    Please note that patient is planning to go back to [place] where his family lives in mid December.  Set up OPAT with [employer] which can arrange home abx infusion in [facility] and [place].    - switch nafcillin to cefazolin 2g IV q8h   - f/u OR culture from 11/15  - Place single lumen PICC line   - Duration of antibiotics is 6 weeks from the closure ( 11/18 - 12/29 )  - Weekly labs: CMP, CBC, ESR, CRP faxed to ID office at [phone]  - Patient to follow up with Dr. durham in 2 weeks ([address] [place] [address], [phone]), ID office will call patient to schedule       Team 2 will follow you.   Case d/w primary team.    Virginia Durham MD, MS  Infectious Disease attending  work cell [phone]   For any questions during evening/weekend/holiday, please page ID on call

## 2021-11-18 NOTE — PROGRESS NOTE ADULT - SUBJECTIVE AND OBJECTIVE BOX
INFECTIOUS DISEASES CONSULT FOLLOW-UP NOTE    INTERVAL HPI/OVERNIGHT EVENTS:  No event overnight  patietn went to OR today for flap closure  very tired     ROS:   Constitutional, eyes, ENT, cardiovascular, respiratory, gastrointestinal, genitourinary, integumentary, neurological, psychiatric and heme/lymph are otherwise negative other than noted above       ANTIBIOTICS/RELEVANT:    MEDICATIONS  (STANDING):  ceFAZolin   IVPB 2000 milliGRAM(s) IV Intermittent every 8 hours  chlorhexidine 2% Cloths 1 Application(s) Topical <User Schedule>  enoxaparin Injectable 40 milliGRAM(s) SubCutaneous every 24 hours  folic acid 1 milliGRAM(s) Oral daily  hydrocortisone 2.5% Ointment 1 Application(s) Topical every 12 hours  lactated ringers. 1000 milliLiter(s) (110 mL/Hr) IV Continuous <Continuous>  lactated ringers. 1000 milliLiter(s) (120 mL/Hr) IV Continuous <Continuous>  multivitamin 1 Tablet(s) Oral daily  pantoprazole    Tablet 40 milliGRAM(s) Oral before breakfast  polyethylene glycol 3350 17 Gram(s) Oral at bedtime  senna 2 Tablet(s) Oral at bedtime    MEDICATIONS  (PRN):  acetaminophen     Tablet .. 650 milliGRAM(s) Oral every 6 hours PRN Temp greater or equal to 38C (100.4F), Mild Pain (1 - 3)  bisacodyl Suppository 10 milliGRAM(s) Rectal once PRN Constipation  diphenhydrAMINE 25 milliGRAM(s) Oral every 4 hours PRN Rash and/or Itching  HYDROmorphone  Injectable 0.5 milliGRAM(s) IV Push every 4 hours PRN breakthrough  HYDROmorphone  Injectable 0.5 milliGRAM(s) IV Push every 15 minutes PRN pacu  magnesium hydroxide Suspension 30 milliLiter(s) Oral daily PRN Constipation  ondansetron Injectable 4 milliGRAM(s) IV Push every 4 hours PRN Nausea and/or Vomiting  oxyCODONE    IR 10 milliGRAM(s) Oral every 4 hours PRN Severe Pain (7 - 10)  oxyCODONE    IR 5 milliGRAM(s) Oral every 4 hours PRN Moderate Pain (4 - 6)  simethicone 80 milliGRAM(s) Chew daily PRN Indigestion  sodium chloride 0.9% lock flush 10 milliLiter(s) IV Push every 1 hour PRN Pre/post blood products, medications, blood draw, and to maintain line patency        Vital Signs Last 24 Hrs  T(C): 36.7 (18 Nov 2021 13:38), Max: 37.1 (17 Nov 2021 16:36)  T(F): 98.1 (18 Nov 2021 13:38), Max: 98.7 (17 Nov 2021 16:36)  HR: 79 (18 Nov 2021 13:38) (66 - 99)  BP: 115/69 (18 Nov 2021 13:38) (112/69 - 147/75)  BP(mean): 90 (18 Nov 2021 13:01) (86 - 102)  RR: 16 (18 Nov 2021 13:38) (11 - 20)  SpO2: 96% (18 Nov 2021 13:38) (95% - 100%)    11-17-21 @ 07:01  -  11-18-21 @ 07:00  --------------------------------------------------------  IN: 2500 mL / OUT: 2150 mL / NET: 350 mL      PHYSICAL EXAM:  Constitutional: alert, NAD, very fatigued and pale  Eyes: the sclera and conjunctiva were normal.   ENT: the ears and nose were normal in appearance.   Neck: the appearance of the neck was normal and the neck was supple.   Pulmonary: no respiratory distress and lungs were clear to auscultation bilaterally.   Heart: heart rate was normal and rhythm regular, normal S1 and S2  Ext: R knee covered with dressing with wound vac   Abdomen: normal bowel sounds, soft, non-tender  Neurological: no focal deficits.   Psychiatric: the affect was normal        LABS:                        10.5   6.89  )-----------( 333      ( 18 Nov 2021 05:46 )             33.3     11-18    139  |  104  |  12  ----------------------------<  103<H>  4.0   |  26  |  1.01    Ca    9.4      18 Nov 2021 05:46            MICROBIOLOGY:  11/16 R knee WCx #1: MSSA  11/16 R knee WCx #2: MSSA (S to cefaz, linezo, ox, rifam, bact, vanco)  11/12 R tibia fungal Cx:p  11/11 R tibia WCx: MSSA (S to cefaz, linezo, ox, rifam, bact, vanco)      RADIOLOGY & ADDITIONAL STUDIES:  MRI knee 11/13:   Impression:  Status post ACL reconstruction. Septic arthritis/osteomyelitis as above.  Horizontal tear at the posterior horn of the lateral meniscus.

## 2021-11-18 NOTE — PRE-OP CHECKLIST - SELECT TESTS ORDERED
BMP/CBC/PT/PTT/INR/Type and Screen/EKG/COVID-19
BMP/CBC/PT/PTT/INR/Type and Screen/EKG/COVID-19
BMP/CBC/PT/PTT/INR/Type and Screen/CXR/COVID-19

## 2021-11-18 NOTE — BRIEF OPERATIVE NOTE - OPERATION/FINDINGS
see operative report
R knee I&D, medial gastrocnemius flap closure, skin graft harvest    Wound cultures x2

## 2021-11-19 LAB
ANION GAP SERPL CALC-SCNC: 8 MMOL/L — SIGNIFICANT CHANGE UP (ref 5–17)
BUN SERPL-MCNC: 9 MG/DL — SIGNIFICANT CHANGE UP (ref 7–23)
CALCIUM SERPL-MCNC: 8.9 MG/DL — SIGNIFICANT CHANGE UP (ref 8.4–10.5)
CHLORIDE SERPL-SCNC: 104 MMOL/L — SIGNIFICANT CHANGE UP (ref 96–108)
CO2 SERPL-SCNC: 26 MMOL/L — SIGNIFICANT CHANGE UP (ref 22–31)
CREAT SERPL-MCNC: 0.92 MG/DL — SIGNIFICANT CHANGE UP (ref 0.5–1.3)
CRP SERPL-MCNC: 35.9 MG/L — HIGH (ref 0–4)
ERYTHROCYTE [SEDIMENTATION RATE] IN BLOOD: 45 MM/HR — HIGH
GLUCOSE SERPL-MCNC: 114 MG/DL — HIGH (ref 70–99)
HCT VFR BLD CALC: 33 % — LOW (ref 39–50)
HGB BLD-MCNC: 10.5 G/DL — LOW (ref 13–17)
MCHC RBC-ENTMCNC: 28.6 PG — SIGNIFICANT CHANGE UP (ref 27–34)
MCHC RBC-ENTMCNC: 31.8 GM/DL — LOW (ref 32–36)
MCV RBC AUTO: 89.9 FL — SIGNIFICANT CHANGE UP (ref 80–100)
NRBC # BLD: 0 /100 WBCS — SIGNIFICANT CHANGE UP (ref 0–0)
PLATELET # BLD AUTO: 393 K/UL — SIGNIFICANT CHANGE UP (ref 150–400)
POTASSIUM SERPL-MCNC: 4 MMOL/L — SIGNIFICANT CHANGE UP (ref 3.5–5.3)
POTASSIUM SERPL-SCNC: 4 MMOL/L — SIGNIFICANT CHANGE UP (ref 3.5–5.3)
RBC # BLD: 3.67 M/UL — LOW (ref 4.2–5.8)
RBC # FLD: 12.8 % — SIGNIFICANT CHANGE UP (ref 10.3–14.5)
SODIUM SERPL-SCNC: 138 MMOL/L — SIGNIFICANT CHANGE UP (ref 135–145)
WBC # BLD: 9.11 K/UL — SIGNIFICANT CHANGE UP (ref 3.8–10.5)
WBC # FLD AUTO: 9.11 K/UL — SIGNIFICANT CHANGE UP (ref 3.8–10.5)

## 2021-11-19 RX ADMIN — Medication 100 MILLIGRAM(S): at 21:32

## 2021-11-19 RX ADMIN — Medication 1 APPLICATION(S): at 05:03

## 2021-11-19 RX ADMIN — Medication 1 TABLET(S): at 12:48

## 2021-11-19 RX ADMIN — Medication 1 MILLIGRAM(S): at 12:48

## 2021-11-19 RX ADMIN — Medication 100 MILLIGRAM(S): at 12:48

## 2021-11-19 RX ADMIN — OXYCODONE HYDROCHLORIDE 5 MILLIGRAM(S): 5 TABLET ORAL at 09:14

## 2021-11-19 RX ADMIN — OXYCODONE HYDROCHLORIDE 5 MILLIGRAM(S): 5 TABLET ORAL at 15:59

## 2021-11-19 RX ADMIN — OXYCODONE HYDROCHLORIDE 10 MILLIGRAM(S): 5 TABLET ORAL at 05:02

## 2021-11-19 RX ADMIN — OXYCODONE HYDROCHLORIDE 10 MILLIGRAM(S): 5 TABLET ORAL at 06:02

## 2021-11-19 RX ADMIN — OXYCODONE HYDROCHLORIDE 10 MILLIGRAM(S): 5 TABLET ORAL at 20:37

## 2021-11-19 RX ADMIN — CHLORHEXIDINE GLUCONATE 1 APPLICATION(S): 213 SOLUTION TOPICAL at 05:03

## 2021-11-19 RX ADMIN — OXYCODONE HYDROCHLORIDE 10 MILLIGRAM(S): 5 TABLET ORAL at 12:48

## 2021-11-19 RX ADMIN — ENOXAPARIN SODIUM 40 MILLIGRAM(S): 100 INJECTION SUBCUTANEOUS at 19:14

## 2021-11-19 RX ADMIN — Medication 1 APPLICATION(S): at 19:26

## 2021-11-19 RX ADMIN — Medication 100 MILLIGRAM(S): at 04:55

## 2021-11-19 RX ADMIN — PANTOPRAZOLE SODIUM 40 MILLIGRAM(S): 20 TABLET, DELAYED RELEASE ORAL at 05:04

## 2021-11-19 NOTE — OCCUPATIONAL THERAPY INITIAL EVALUATION ADULT - LEVEL OF INDEPENDENCE: DRESS LOWER BODY, OT EVAL
pt able to don sock on L foot independently. Pt requiring maxA for donning sock to R foot./moderate assist (50% patients effort)

## 2021-11-19 NOTE — OCCUPATIONAL THERAPY INITIAL EVALUATION ADULT - PERTINENT HX OF CURRENT PROBLEM, REHAB EVAL
21 year old male s/p right ACL reconstruction in 2014 with subsequent revisions and recent infection s/p right knee washout in july 2021 sent to the ED by Dr. Dasilva with concern for continued infection. The patient states he had a right knee washout in july 2021 and was subsequently on IV antibiotics for 4 weeks. S/p I&D R ACL Reconstruction 11/11. s/p Closure using gastrocnemius flap 11/18/21.

## 2021-11-19 NOTE — PROGRESS NOTE ADULT - SUBJECTIVE AND OBJECTIVE BOX
Ortho Note    Pt seen and examined on morning rounds. Pt comfortable without complaints, pain controlled.  Denies CP, SOB, N/V, numbness/tingling     Vital Signs Last 24 Hrs  T(C): 36.8 (11-19-21 @ 04:57), Max: 36.8 (11-19-21 @ 04:57)  T(F): 98.3 (11-19-21 @ 04:57), Max: 98.3 (11-19-21 @ 04:57)  HR: 93 (11-19-21 @ 04:57) (93 - 93)  BP: 115/70 (11-19-21 @ 04:57) (115/70 - 115/70)  BP(mean): 85 (11-19-21 @ 04:57) (85 - 85)  RR: 17 (11-19-21 @ 04:57) (17 - 17)  SpO2: 98% (11-19-21 @ 04:57) (98% - 98%)  I&O's Summary    18 Nov 2021 07:01  -  19 Nov 2021 07:00  --------------------------------------------------------  IN: 480 mL / OUT: 1635 mL / NET: -1155 mL        Physical Exam:  General: Pt Alert and oriented, NAD  DSG C/D/I  Pulses: 2+ radial or dp, pt pulses, wwp, cap refill <3 seconds  Sensation: SILT sural/saph/sp/dp/ tibial or axillary/radial/median/ulnar distributions  Motor: EHL/FHL/TA/GS or axillary/AIN/PIN/ulnar firing                          10.5   6.89  )-----------( 333      ( 18 Nov 2021 05:46 )             33.3     11-18    139  |  104  |  12  ----------------------------<  103<H>  4.0   |  26  |  1.01    Ca    9.4      18 Nov 2021 05:46        A/P: 21yMale POD#1 s/p I&D R knee #3 with plastic closure with skin/medial gastroc graft  - Stable  - Pain Control  - f/u OR cultures  - DVT ppx: 40mg subQ daily   - PT, WBS: NWB in KI RLE x 2 weeks  - Plastics co management of graft viability monitoring, drains  - dispo: home with home infusion minimum 5 days from OR pending progress/graft viability     Jerry Serrato, PGY-1  Ortho Pager [phone]

## 2021-11-19 NOTE — OCCUPATIONAL THERAPY INITIAL EVALUATION ADULT - GENERAL OBSERVATIONS, REHAB EVAL
Pt cleared for OT by TERRY Carrillo. Pt received semi-rodriguez, NAD, +wound vac, +R KI, +PICC, +father.

## 2021-11-19 NOTE — PROGRESS NOTE ADULT - SUBJECTIVE AND OBJECTIVE BOX
Ortho Note    Surgery: POD#1 s/p Right knee I&D, Plastics closure with medial gastroc flap and right lateral thigh skin graft  Patient seen and examined at bedside this AM  Dad at bedside   Pt comfortable without complaints, pain controlled at rest, endorses medial calf tenderness   Denies CP, SOB, N/V, numbness/tingling   Voiding without difficulty  No BM since preop     Vital Signs Last 24 Hrs  T(C): 36.6 (11-19-21 @ 12:58), Max: 36.6 (11-19-21 @ 12:58)  T(F): 97.9 (11-19-21 @ 12:58), Max: 97.9 (11-19-21 @ 12:58)  HR: 96 (11-19-21 @ 12:58) (96 - 96)  BP: 119/70 (11-19-21 @ 12:58) (119/70 - 119/70)  BP(mean): --  RR: 16 (11-19-21 @ 12:58) (16 - 16)  SpO2: 97% (11-19-21 @ 12:58) (97% - 97%)  AVSS    General: Pt Alert and oriented, NAD  Dressing C/D/I: Tegaderm to the right thigh graft sight  CHRISTINE drain, prineo, telfa, abd, kerlix, ace  wound vac to anterior incision   Pulses: 2+ DP bilateral LE   Sensation: intact to light touch   Motor: EHL/FHL/TA/GS 5/5 RLE         A/P: 21yMale POD#1 s/p Right Knee I&D, Plastics closure with medial gastroc flap and right lateral thigh skin graft with Dr. Dasilva/Dr. Ceballos     - Stable, VSS, labs stable   - CHRISTINE drain: output 465cc @ 24 hrs   - Pain Control: IV and PO PRN   - DVT ppx: lovenox 40mg daily   - NWB RLE, On bedrest until tuesday per Plastic Surgeon Dr. Ceballos   Plan: Nursing to reinforce tegaderm     Ortho Pager [phone] Ortho Note    Surgery: POD#1 s/p Right knee I&D, Plastics closure with medial gastroc flap and right lateral thigh skin graft  Patient seen and examined at bedside this AM  Dad at bedside   Pt comfortable without complaints, pain controlled at rest, endorses medial calf tenderness   Denies CP, SOB, N/V, numbness/tingling   Voiding without difficulty  No BM since preop     Vital Signs Last 24 Hrs  T(C): 36.6 (11-19-21 @ 12:58), Max: 36.6 (11-19-21 @ 12:58)  T(F): 97.9 (11-19-21 @ 12:58), Max: 97.9 (11-19-21 @ 12:58)  HR: 96 (11-19-21 @ 12:58) (96 - 96)  BP: 119/70 (11-19-21 @ 12:58) (119/70 - 119/70)  BP(mean): --  RR: 16 (11-19-21 @ 12:58) (16 - 16)  SpO2: 97% (11-19-21 @ 12:58) (97% - 97%)  AVSS    General: Pt Alert and oriented, NAD  Dressing C/D/I: Tegaderm to the right thigh graft sight  CHRISTINE drain, prineo, telfa, abd, kerlix, ace  wound vac to anterior incision   Pulses: 2+ DP bilateral LE   Sensation: intact to light touch   Motor: EHL/FHL/TA/GS 5/5 RLE         A/P: 21yMale POD#1 s/p Right Knee I&D, Plastics closure with medial gastroc flap and right lateral thigh skin graft with Dr. Dasilva/Dr. Ceballos     - Stable, VSS, labs stable   - CHRISTINE drain: output 465cc @ 24 hrs   - Pain Control: IV and PO PRN   - DVT ppx: lovenox 40mg daily   - NWB RLE, On bedrest until tuesday per Plastic Surgeon Dr. Ceballos   Wound care:   -Nursing to reinforce tegaderm right lateral thigh daily  -Wound vac anterior incision in place until tuesday  -Remove Tuesday 11/23: apply bacitracin/xerform/gauze for daily changes  -Medial gastroc incision: Keep CHRISTINE until outpatient f/u with Dr. Ceballos  Change dressing sunday 11/21: prineo with telfa/abd/kerlix/ace wrap    Anticipate home with home Infusion Tuesday 11/23 pending progress  Outpatient follow up with Dr. Ceballos Monday 11/29  Bowel Regimen daily as patient is on bedrest until Tuesday       Ortho Pager [phone] Ortho Note    Surgery: POD#1 s/p Right knee I&D, Plastics closure with medial gastroc flap and right lateral thigh skin graft  Patient seen and examined at bedside this AM  Dad at bedside   Pt comfortable without complaints, pain controlled at rest, endorses medial calf tenderness   Denies CP, SOB, N/V, numbness/tingling   Voiding without difficulty  No BM since preop     Vital Signs Last 24 Hrs  T(C): 36.6 (11-19-21 @ 12:58), Max: 36.6 (11-19-21 @ 12:58)  T(F): 97.9 (11-19-21 @ 12:58), Max: 97.9 (11-19-21 @ 12:58)  HR: 96 (11-19-21 @ 12:58) (96 - 96)  BP: 119/70 (11-19-21 @ 12:58) (119/70 - 119/70)  BP(mean): --  RR: 16 (11-19-21 @ 12:58) (16 - 16)  SpO2: 97% (11-19-21 @ 12:58) (97% - 97%)  AVSS    General: Pt Alert and oriented, NAD  Dressing C/D/I: Tegaderm to the right thigh graft sight  CHRISTINE drain, prineo, telfa, abd, kerlix, ace  wound vac to anterior incision   Pulses: 2+ DP bilateral LE   Sensation: intact to light touch   Motor: EHL/FHL/TA/GS 5/5 RLE         A/P: 21yMale POD#1 s/p Right Knee I&D, Plastics closure with medial gastroc flap and right lateral thigh skin graft with Dr. Dasilva/Dr. Ceballos     - Stable, VSS, labs stable   - CHRISTINE drain: output 465cc @ 24 hrs   - Pain Control: IV and PO PRN   - DVT ppx: lovenox 40mg daily   - NWB RLE, On bedrest until tuesday per Plastic Surgeon Dr. Ceballos   - Continue Ancef 2g q8 until 12/29; OR cx 11/18 rare gram positive cocci in pairs- trend   Wound care:   -Nursing to reinforce tegaderm right lateral thigh daily  -Wound vac anterior incision in place until tuesday  -Remove Tuesday 11/23: apply bacitracin/xerform/gauze for daily changes  -Medial gastroc incision: Keep CHRISTINE until outpatient f/u with Dr. Ceballos  Change dressing sunday 11/21: prineo with telfa/abd/kerlix/ace wrap    Anticipate home with home Infusion Tuesday 11/23 pending progress  Outpatient follow up with Dr. Ceballos Monday 11/29  Bowel Regimen daily as patient is on bedrest until Tuesday       Ortho Pager [phone]

## 2021-11-20 LAB
ANION GAP SERPL CALC-SCNC: 9 MMOL/L — SIGNIFICANT CHANGE UP (ref 5–17)
BASOPHILS # BLD AUTO: 0.02 K/UL — SIGNIFICANT CHANGE UP (ref 0–0.2)
BASOPHILS NFR BLD AUTO: 0.3 % — SIGNIFICANT CHANGE UP (ref 0–2)
BUN SERPL-MCNC: 11 MG/DL — SIGNIFICANT CHANGE UP (ref 7–23)
CALCIUM SERPL-MCNC: 9.3 MG/DL — SIGNIFICANT CHANGE UP (ref 8.4–10.5)
CHLORIDE SERPL-SCNC: 101 MMOL/L — SIGNIFICANT CHANGE UP (ref 96–108)
CO2 SERPL-SCNC: 27 MMOL/L — SIGNIFICANT CHANGE UP (ref 22–31)
CREAT SERPL-MCNC: 0.89 MG/DL — SIGNIFICANT CHANGE UP (ref 0.5–1.3)
EOSINOPHIL # BLD AUTO: 0.59 K/UL — HIGH (ref 0–0.5)
EOSINOPHIL NFR BLD AUTO: 7.6 % — HIGH (ref 0–6)
GLUCOSE SERPL-MCNC: 101 MG/DL — HIGH (ref 70–99)
HCT VFR BLD CALC: 33.1 % — LOW (ref 39–50)
HGB BLD-MCNC: 10.7 G/DL — LOW (ref 13–17)
IMM GRANULOCYTES NFR BLD AUTO: 1 % — SIGNIFICANT CHANGE UP (ref 0–1.5)
LYMPHOCYTES # BLD AUTO: 1.35 K/UL — SIGNIFICANT CHANGE UP (ref 1–3.3)
LYMPHOCYTES # BLD AUTO: 17.3 % — SIGNIFICANT CHANGE UP (ref 13–44)
MCHC RBC-ENTMCNC: 29.1 PG — SIGNIFICANT CHANGE UP (ref 27–34)
MCHC RBC-ENTMCNC: 32.3 GM/DL — SIGNIFICANT CHANGE UP (ref 32–36)
MCV RBC AUTO: 89.9 FL — SIGNIFICANT CHANGE UP (ref 80–100)
MONOCYTES # BLD AUTO: 0.59 K/UL — SIGNIFICANT CHANGE UP (ref 0–0.9)
MONOCYTES NFR BLD AUTO: 7.6 % — SIGNIFICANT CHANGE UP (ref 2–14)
NEUTROPHILS # BLD AUTO: 5.18 K/UL — SIGNIFICANT CHANGE UP (ref 1.8–7.4)
NEUTROPHILS NFR BLD AUTO: 66.2 % — SIGNIFICANT CHANGE UP (ref 43–77)
NRBC # BLD: 0 /100 WBCS — SIGNIFICANT CHANGE UP (ref 0–0)
PLATELET # BLD AUTO: 353 K/UL — SIGNIFICANT CHANGE UP (ref 150–400)
POTASSIUM SERPL-MCNC: 4.3 MMOL/L — SIGNIFICANT CHANGE UP (ref 3.5–5.3)
POTASSIUM SERPL-SCNC: 4.3 MMOL/L — SIGNIFICANT CHANGE UP (ref 3.5–5.3)
RBC # BLD: 3.68 M/UL — LOW (ref 4.2–5.8)
RBC # FLD: 12.9 % — SIGNIFICANT CHANGE UP (ref 10.3–14.5)
SODIUM SERPL-SCNC: 137 MMOL/L — SIGNIFICANT CHANGE UP (ref 135–145)
WBC # BLD: 7.81 K/UL — SIGNIFICANT CHANGE UP (ref 3.8–10.5)
WBC # FLD AUTO: 7.81 K/UL — SIGNIFICANT CHANGE UP (ref 3.8–10.5)

## 2021-11-20 RX ADMIN — Medication 100 MILLIGRAM(S): at 13:08

## 2021-11-20 RX ADMIN — PANTOPRAZOLE SODIUM 40 MILLIGRAM(S): 20 TABLET, DELAYED RELEASE ORAL at 06:41

## 2021-11-20 RX ADMIN — Medication 100 MILLIGRAM(S): at 05:35

## 2021-11-20 RX ADMIN — Medication 1 APPLICATION(S): at 05:34

## 2021-11-20 RX ADMIN — Medication 100 MILLIGRAM(S): at 21:12

## 2021-11-20 RX ADMIN — Medication 1 MILLIGRAM(S): at 11:24

## 2021-11-20 RX ADMIN — CHLORHEXIDINE GLUCONATE 1 APPLICATION(S): 213 SOLUTION TOPICAL at 05:34

## 2021-11-20 RX ADMIN — ENOXAPARIN SODIUM 40 MILLIGRAM(S): 100 INJECTION SUBCUTANEOUS at 18:33

## 2021-11-20 RX ADMIN — Medication 1 APPLICATION(S): at 18:34

## 2021-11-20 RX ADMIN — Medication 1 TABLET(S): at 11:24

## 2021-11-20 NOTE — PROGRESS NOTE ADULT - SUBJECTIVE AND OBJECTIVE BOX
Ortho Note    Surgery: POD#2 s/p Right knee I&D, Plastics closure with medial gastroc flap and right lateral thigh skin graft  Patient seen and examined at bedside this AM   Pt comfortable without complaints, pain controlled at rest, endorses medial calf tenderness   Denies CP, SOB, N/V, numbness/tingling   Voiding without difficulty  No BM since preop     Vital Signs Last 24 Hrs  T(C): 36.8 (20 Nov 2021 05:28), Max: 37.2 (19 Nov 2021 08:50)  T(F): 98.2 (20 Nov 2021 05:28), Max: 99 (19 Nov 2021 21:06)  HR: 95 (20 Nov 2021 05:28) (93 - 99)  BP: 112/67 (20 Nov 2021 05:28) (106/69 - 123/77)  BP(mean): 81 (19 Nov 2021 08:50) (81 - 81)  RR: 17 (20 Nov 2021 05:28) (16 - 18)  SpO2: 98% (20 Nov 2021 05:28) (97% - 99%)    AVSS    General: Pt Alert and oriented, NAD  Dressing C/D/I: Tegaderm to the right thigh graft sight  CHRISTINE drain, prineo, telfa, abd, kerlix, ace  wound vac to anterior incision   Pulses: 2+ DP bilateral LE   Sensation: intact to light touch   Motor: EHL/FHL/TA/GS 5/5 RLE         A/P: 21yMale POD#2 s/p Right Knee I&D, Plastics closure with medial gastroc flap and right lateral thigh skin graft with Dr. Dasilva/Dr. Ceballos     - Stable, VSS, labs stable   - Pain Control: IV and PO PRN   - DVT ppx: lovenox 40mg daily   - NWB RLE, On bedrest until tuesday per Plastic Surgeon Dr. Ceballos   - Continue Ancef 2g q8 until 12/29; OR cx 11/18 rare gram positive cocci in pairs- trend   Wound care:   -Nursing to reinforce tegaderm right lateral thigh daily  -Wound vac anterior incision in place until tuesday  -Remove Tuesday 11/23: apply bacitracin/xerform/gauze for daily changes  -Medial gastroc incision: Keep CHRISTINE until outpatient f/u with Dr. Ceballos  Change dressing sunday 11/21: prineo with telfa/abd/kerlix/ace wrap    Anticipate home with home Infusion Tuesday 11/23 pending progress  Outpatient follow up with Dr. Ceballos Monday 11/29  Bowel Regimen daily as patient is on bedrest until Tuesday       Ortho Pager [phone]

## 2021-11-20 NOTE — PROGRESS NOTE ADULT - SUBJECTIVE AND OBJECTIVE BOX
S:  Patient resting comfortably in bed.  Patient's father at bedside.  States overall pain controlled and not currently taking any pain medication.  States calf soreness with motion only but improving daily and c/w gastroc flap.  States no current thigh pain.    O: Gen- INAD, alert and oriented  RLE- Knee immobilizer intact, NSLT at ankle and foot, intact ankle DF and PF with mild pain of active DF of ankle but no significant pain with ankle passive DF, cap refill <2 secs throughout.  A/P:  RIGHT knee septic arthritis / leg osteomyelitis  - Continue immobilization / vac dressing per Plastics  - Enoxaparin for DVT prophylaxis  - IV antibiotics per ID

## 2021-11-21 LAB
ANION GAP SERPL CALC-SCNC: 10 MMOL/L — SIGNIFICANT CHANGE UP (ref 5–17)
BASOPHILS # BLD AUTO: 0.04 K/UL — SIGNIFICANT CHANGE UP (ref 0–0.2)
BASOPHILS NFR BLD AUTO: 0.6 % — SIGNIFICANT CHANGE UP (ref 0–2)
BUN SERPL-MCNC: 14 MG/DL — SIGNIFICANT CHANGE UP (ref 7–23)
CALCIUM SERPL-MCNC: 9.6 MG/DL — SIGNIFICANT CHANGE UP (ref 8.4–10.5)
CHLORIDE SERPL-SCNC: 99 MMOL/L — SIGNIFICANT CHANGE UP (ref 96–108)
CO2 SERPL-SCNC: 26 MMOL/L — SIGNIFICANT CHANGE UP (ref 22–31)
CREAT SERPL-MCNC: 0.93 MG/DL — SIGNIFICANT CHANGE UP (ref 0.5–1.3)
EOSINOPHIL # BLD AUTO: 0.64 K/UL — HIGH (ref 0–0.5)
EOSINOPHIL NFR BLD AUTO: 8.9 % — HIGH (ref 0–6)
GLUCOSE SERPL-MCNC: 105 MG/DL — HIGH (ref 70–99)
HCT VFR BLD CALC: 33.5 % — LOW (ref 39–50)
HGB BLD-MCNC: 10.5 G/DL — LOW (ref 13–17)
IMM GRANULOCYTES NFR BLD AUTO: 1.2 % — SIGNIFICANT CHANGE UP (ref 0–1.5)
LYMPHOCYTES # BLD AUTO: 1.4 K/UL — SIGNIFICANT CHANGE UP (ref 1–3.3)
LYMPHOCYTES # BLD AUTO: 19.4 % — SIGNIFICANT CHANGE UP (ref 13–44)
MCHC RBC-ENTMCNC: 27.8 PG — SIGNIFICANT CHANGE UP (ref 27–34)
MCHC RBC-ENTMCNC: 31.3 GM/DL — LOW (ref 32–36)
MCV RBC AUTO: 88.6 FL — SIGNIFICANT CHANGE UP (ref 80–100)
MONOCYTES # BLD AUTO: 0.55 K/UL — SIGNIFICANT CHANGE UP (ref 0–0.9)
MONOCYTES NFR BLD AUTO: 7.6 % — SIGNIFICANT CHANGE UP (ref 2–14)
NEUTROPHILS # BLD AUTO: 4.49 K/UL — SIGNIFICANT CHANGE UP (ref 1.8–7.4)
NEUTROPHILS NFR BLD AUTO: 62.3 % — SIGNIFICANT CHANGE UP (ref 43–77)
NRBC # BLD: 0 /100 WBCS — SIGNIFICANT CHANGE UP (ref 0–0)
PLATELET # BLD AUTO: 362 K/UL — SIGNIFICANT CHANGE UP (ref 150–400)
POTASSIUM SERPL-MCNC: 4.1 MMOL/L — SIGNIFICANT CHANGE UP (ref 3.5–5.3)
POTASSIUM SERPL-SCNC: 4.1 MMOL/L — SIGNIFICANT CHANGE UP (ref 3.5–5.3)
RBC # BLD: 3.78 M/UL — LOW (ref 4.2–5.8)
RBC # FLD: 12.8 % — SIGNIFICANT CHANGE UP (ref 10.3–14.5)
SODIUM SERPL-SCNC: 135 MMOL/L — SIGNIFICANT CHANGE UP (ref 135–145)
WBC # BLD: 7.21 K/UL — SIGNIFICANT CHANGE UP (ref 3.8–10.5)
WBC # FLD AUTO: 7.21 K/UL — SIGNIFICANT CHANGE UP (ref 3.8–10.5)

## 2021-11-21 RX ADMIN — Medication 100 MILLIGRAM(S): at 21:47

## 2021-11-21 RX ADMIN — Medication 1 MILLIGRAM(S): at 11:38

## 2021-11-21 RX ADMIN — ENOXAPARIN SODIUM 40 MILLIGRAM(S): 100 INJECTION SUBCUTANEOUS at 17:23

## 2021-11-21 RX ADMIN — Medication 1 TABLET(S): at 11:38

## 2021-11-21 RX ADMIN — CHLORHEXIDINE GLUCONATE 1 APPLICATION(S): 213 SOLUTION TOPICAL at 06:01

## 2021-11-21 RX ADMIN — Medication 100 MILLIGRAM(S): at 13:04

## 2021-11-21 RX ADMIN — POLYETHYLENE GLYCOL 3350 17 GRAM(S): 17 POWDER, FOR SOLUTION ORAL at 21:47

## 2021-11-21 RX ADMIN — SENNA PLUS 2 TABLET(S): 8.6 TABLET ORAL at 21:47

## 2021-11-21 RX ADMIN — Medication 100 MILLIGRAM(S): at 05:01

## 2021-11-21 RX ADMIN — Medication 1 APPLICATION(S): at 17:23

## 2021-11-21 NOTE — PROGRESS NOTE ADULT - SUBJECTIVE AND OBJECTIVE BOX
Ortho Note    Surgery: POD#3 s/p Right knee I&D, Plastics closure with medial gastroc flap and right lateral thigh skin graft  Patient seen and examined at bedside this AM   Pt comfortable without complaints, pain controlled at rest, endorses medial calf tenderness   Denies CP, SOB, N/V, numbness/tingling   Voiding without difficulty  No BM since preop     Vital Signs Last 24 Hrs  T(C): 36.8 (20 Nov 2021 05:28), Max: 37.2 (19 Nov 2021 08:50)  T(F): 98.2 (20 Nov 2021 05:28), Max: 99 (19 Nov 2021 21:06)  HR: 95 (20 Nov 2021 05:28) (93 - 99)  BP: 112/67 (20 Nov 2021 05:28) (106/69 - 123/77)  BP(mean): 81 (19 Nov 2021 08:50) (81 - 81)  RR: 17 (20 Nov 2021 05:28) (16 - 18)  SpO2: 98% (20 Nov 2021 05:28) (97% - 99%)    AVSS    General: Pt Alert and oriented, NAD  Dressing C/D/I: Tegaderm to the right thigh graft sight  CHRISTINE drain, prineo, telfa, abd, kerlix, ace  wound vac to anterior incision   Pulses: 2+ DP bilateral LE   Sensation: intact to light touch   Motor: EHL/FHL/TA/GS 5/5 RLE         A/P: 21yMale POD#3 s/p Right Knee I&D, Plastics closure with medial gastroc flap and right lateral thigh skin graft with Dr. Dasilva/Dr. Ceballos     A/P:  RIGHT knee septic arthritis / leg osteomyelitis  - Continue immobilization / vac dressing per Plastics  - Enoxaparin for DVT prophylaxis  - IV antibiotics per ID     Anticipate home with home Infusion Tuesday 11/23 pending progress  Outpatient follow up with Dr. Ceballos Monday 11/29  Bowel Regimen daily as patient is on bedrest until Tuesday       Ortho Pager [phone]

## 2021-11-22 LAB
ANION GAP SERPL CALC-SCNC: 11 MMOL/L — SIGNIFICANT CHANGE UP (ref 5–17)
BASOPHILS # BLD AUTO: 0.04 K/UL — SIGNIFICANT CHANGE UP (ref 0–0.2)
BASOPHILS NFR BLD AUTO: 0.5 % — SIGNIFICANT CHANGE UP (ref 0–2)
BUN SERPL-MCNC: 15 MG/DL — SIGNIFICANT CHANGE UP (ref 7–23)
CALCIUM SERPL-MCNC: 9.2 MG/DL — SIGNIFICANT CHANGE UP (ref 8.4–10.5)
CHLORIDE SERPL-SCNC: 102 MMOL/L — SIGNIFICANT CHANGE UP (ref 96–108)
CO2 SERPL-SCNC: 27 MMOL/L — SIGNIFICANT CHANGE UP (ref 22–31)
CREAT SERPL-MCNC: 0.9 MG/DL — SIGNIFICANT CHANGE UP (ref 0.5–1.3)
CULTURE RESULTS: SIGNIFICANT CHANGE UP
EOSINOPHIL # BLD AUTO: 0.7 K/UL — HIGH (ref 0–0.5)
EOSINOPHIL NFR BLD AUTO: 9.5 % — HIGH (ref 0–6)
GLUCOSE SERPL-MCNC: 115 MG/DL — HIGH (ref 70–99)
HCT VFR BLD CALC: 32.2 % — LOW (ref 39–50)
HGB BLD-MCNC: 10.6 G/DL — LOW (ref 13–17)
IMM GRANULOCYTES NFR BLD AUTO: 1.2 % — SIGNIFICANT CHANGE UP (ref 0–1.5)
LYMPHOCYTES # BLD AUTO: 1.74 K/UL — SIGNIFICANT CHANGE UP (ref 1–3.3)
LYMPHOCYTES # BLD AUTO: 23.7 % — SIGNIFICANT CHANGE UP (ref 13–44)
MCHC RBC-ENTMCNC: 28.9 PG — SIGNIFICANT CHANGE UP (ref 27–34)
MCHC RBC-ENTMCNC: 32.9 GM/DL — SIGNIFICANT CHANGE UP (ref 32–36)
MCV RBC AUTO: 87.7 FL — SIGNIFICANT CHANGE UP (ref 80–100)
MONOCYTES # BLD AUTO: 0.51 K/UL — SIGNIFICANT CHANGE UP (ref 0–0.9)
MONOCYTES NFR BLD AUTO: 6.9 % — SIGNIFICANT CHANGE UP (ref 2–14)
NEUTROPHILS # BLD AUTO: 4.27 K/UL — SIGNIFICANT CHANGE UP (ref 1.8–7.4)
NEUTROPHILS NFR BLD AUTO: 58.2 % — SIGNIFICANT CHANGE UP (ref 43–77)
NRBC # BLD: 0 /100 WBCS — SIGNIFICANT CHANGE UP (ref 0–0)
PLATELET # BLD AUTO: 380 K/UL — SIGNIFICANT CHANGE UP (ref 150–400)
POTASSIUM SERPL-MCNC: 4 MMOL/L — SIGNIFICANT CHANGE UP (ref 3.5–5.3)
POTASSIUM SERPL-SCNC: 4 MMOL/L — SIGNIFICANT CHANGE UP (ref 3.5–5.3)
RBC # BLD: 3.67 M/UL — LOW (ref 4.2–5.8)
RBC # FLD: 12.7 % — SIGNIFICANT CHANGE UP (ref 10.3–14.5)
SODIUM SERPL-SCNC: 140 MMOL/L — SIGNIFICANT CHANGE UP (ref 135–145)
SPECIMEN SOURCE: SIGNIFICANT CHANGE UP
WBC # BLD: 7.35 K/UL — SIGNIFICANT CHANGE UP (ref 3.8–10.5)
WBC # FLD AUTO: 7.35 K/UL — SIGNIFICANT CHANGE UP (ref 3.8–10.5)

## 2021-11-22 RX ADMIN — POLYETHYLENE GLYCOL 3350 17 GRAM(S): 17 POWDER, FOR SOLUTION ORAL at 21:09

## 2021-11-22 RX ADMIN — ENOXAPARIN SODIUM 40 MILLIGRAM(S): 100 INJECTION SUBCUTANEOUS at 17:20

## 2021-11-22 RX ADMIN — PANTOPRAZOLE SODIUM 40 MILLIGRAM(S): 20 TABLET, DELAYED RELEASE ORAL at 06:41

## 2021-11-22 RX ADMIN — SENNA PLUS 2 TABLET(S): 8.6 TABLET ORAL at 21:08

## 2021-11-22 RX ADMIN — Medication 1 MILLIGRAM(S): at 12:04

## 2021-11-22 RX ADMIN — Medication 100 MILLIGRAM(S): at 05:20

## 2021-11-22 RX ADMIN — CHLORHEXIDINE GLUCONATE 1 APPLICATION(S): 213 SOLUTION TOPICAL at 05:20

## 2021-11-22 RX ADMIN — Medication 1 TABLET(S): at 12:04

## 2021-11-22 RX ADMIN — Medication 1 APPLICATION(S): at 05:19

## 2021-11-22 RX ADMIN — Medication 100 MILLIGRAM(S): at 12:11

## 2021-11-22 RX ADMIN — Medication 100 MILLIGRAM(S): at 21:09

## 2021-11-22 NOTE — PROGRESS NOTE ADULT - SUBJECTIVE AND OBJECTIVE BOX
Orthopaedic Surgery Progress Note    Subjective:     Patient seen and examined. Patient comfortable without complaints, pain controlled.      Objective:    Vital Signs Last 24 Hrs  T(C): 36.9 (11-22-21 @ 08:30), Max: 36.9 (11-22-21 @ 08:30)  T(F): 98.5 (11-22-21 @ 08:30), Max: 98.5 (11-22-21 @ 08:30)  HR: 90 (11-22-21 @ 08:30) (90 - 90)  BP: 110/70 (11-22-21 @ 08:30) (110/70 - 110/70)  BP(mean): --  RR: 17 (11-22-21 @ 08:30) (17 - 17)  SpO2: 98% (11-22-21 @ 08:30) (98% - 98%)  AVSS    PE:  General: Patient alert and oriented, NAD  Dressing: Clean/dry/intact right lower extremity in , CHRISTINE drain x 1   Motor: EHL/FHL/TA/GS firing right lower extremity                           10.6   7.35  )-----------( 380      ( 22 Nov 2021 06:09 )             32.2   11-22    140  |  102  |  15  ----------------------------<  115<H>  4.0   |  27  |  0.90    Ca    9.2      22 Nov 2021 06:09        A/P: 21yMale with RIGHT knee septic arthritis / leg osteomyelitis POD#4 s/p Right Knee I&D, Plastics closure with medial gastroc flap and right lateral thigh skin graft with Dr. Dasilva/Dr. Valladares   1. Pain control as needed  2. DVT prophylaxis: lovenox   3. PT, weight-bearing status: WBAT in    4. Dr. Valladares to assess gastro flap tomorrow, plan for likely discharge tomorrow after patient if patient cleared by Dr. Valladares; patient will go home with CHRISTINE drain and will follow up with Dr. Valladares in 1 week for drain removal; per dr. valladares will do daily dressing change at home with bacitracin/xeroform to skin graft     Or

## 2021-11-22 NOTE — PROGRESS NOTE ADULT - SUBJECTIVE AND OBJECTIVE BOX
Ortho Note    Pt seen and examined on morning rounds. Pt comfortable without complaints, pain controlled. WBC within normal limits, cx still NGTD. Dr. Ceballos to assess viability of gastroc flap tomorrow morning before discharge.  Denies CP, SOB, N/V, numbness/tingling     Vital Signs Last 24 Hrs  T(C): 36.9 (11-22-21 @ 05:00), Max: 36.9 (11-22-21 @ 05:00)  T(F): 98.5 (11-22-21 @ 05:00), Max: 98.5 (11-22-21 @ 05:00)  HR: 91 (11-22-21 @ 05:00) (91 - 91)  BP: 110/67 (11-22-21 @ 05:00) (110/67 - 110/67)  BP(mean): --  RR: 18 (11-22-21 @ 05:00) (18 - 18)  SpO2: 98% (11-22-21 @ 05:00) (98% - 98%)  I&O's Summary    20 Nov 2021 07:01  -  21 Nov 2021 07:00  --------------------------------------------------------  IN: 820 mL / OUT: 2185 mL / NET: -1365 mL    21 Nov 2021 07:01  -  22 Nov 2021 06:33  --------------------------------------------------------  IN: 810 mL / OUT: 1840 mL / NET: -1030 mL        Physical Exam:  General: Pt Alert and oriented, NAD  Dressing C/D/I: Tegaderm to the right thigh graft sight  CHRISTINE drain, prineo, telfa, abd, kerlix, ace  wound vac to anterior incision   Pulses: 2+ DP bilateral LE   Sensation: intact to light touch   Motor: EHL/FHL/TA/GS 5/5 RLE                           10.6   7.35  )-----------( 380      ( 22 Nov 2021 06:09 )             32.2     11-21    135  |  99  |  14  ----------------------------<  105<H>  4.1   |  26  |  0.93    Ca    9.6      21 Nov 2021 07:43        A/P: 21yMale RIGHT knee septic arthritis / leg osteomyelitis POD#4 s/p Right Knee I&D, Plastics closure with medial gastroc flap and right lateral thigh skin graft with Dr. Dasilva/Dr. Ceballos   - stable  - pain control  - continue bowel regimen  - DVT PPX: Enoxaparin  - IV antibiotics per ID   - Anticipate home with home Infusion Tuesday 11/23 pending progress  - plastics to assess viability of gastroc flap tomorrow morning before discharge  - Outpatient follow up with Dr. Ceballos Monday 11/29    Jerry Serrato, PGY-1  Ortho Pager [phone] Ortho Note    Pt seen and examined on morning rounds. Pt comfortable without complaints, pain controlled. WBC within normal limits, cx still NGTD. Dr. Ceballos to assess viability of gastroc flap tomorrow morning before discharge.  Denies CP, SOB, N/V, numbness/tingling     Vital Signs Last 24 Hrs  T(C): 36.9 (11-22-21 @ 05:00), Max: 36.9 (11-22-21 @ 05:00)  T(F): 98.5 (11-22-21 @ 05:00), Max: 98.5 (11-22-21 @ 05:00)  HR: 91 (11-22-21 @ 05:00) (91 - 91)  BP: 110/67 (11-22-21 @ 05:00) (110/67 - 110/67)  BP(mean): --  RR: 18 (11-22-21 @ 05:00) (18 - 18)  SpO2: 98% (11-22-21 @ 05:00) (98% - 98%)  I&O's Summary    20 Nov 2021 07:01  -  21 Nov 2021 07:00  --------------------------------------------------------  IN: 820 mL / OUT: 2185 mL / NET: -1365 mL    21 Nov 2021 07:01  -  22 Nov 2021 06:33  --------------------------------------------------------  IN: 810 mL / OUT: 1840 mL / NET: -1030 mL        Physical Exam:  General: Pt Alert and oriented, NAD  Dressing C/D/I: Tegaderm to the right thigh graft sight  CHRISTINE drain, prineo, telfa, abd, kerlix, ace  wound vac to anterior incision   Pulses: 2+ DP bilateral LE   Sensation: intact to light touch   Motor: EHL/FHL/TA/GS 5/5 RLE                           10.6   7.35  )-----------( 380      ( 22 Nov 2021 06:09 )             32.2     11-21    135  |  99  |  14  ----------------------------<  105<H>  4.1   |  26  |  0.93    Ca    9.6      21 Nov 2021 07:43        A/P: 21yMale RIGHT knee septic arthritis / leg osteomyelitis POD#4 s/p Right Knee I&D, Plastics closure with medial gastroc flap and right lateral thigh skin graft with Dr. Dasilva/Dr. Ceballos   - stable  - pain control  - continue bowel regimen  - DVT PPX: Enoxaparin  - IV antibiotics per ID   - Anticipate home with home Infusion Tuesday 11/23 pending progress  - plastics to assess viability of gastroc flap tomorrow morning before discharge  - Outpatient follow up with Dr. Ceballos Monday 11/29  - WBS: PREETI MAHAJAN in [place] Serrato, PGY-1  Ortho Pager [phone]

## 2021-11-23 ENCOUNTER — TRANSCRIPTION ENCOUNTER (OUTPATIENT)
Age: 21
End: 2021-11-23

## 2021-11-23 VITALS
HEART RATE: 89 BPM | DIASTOLIC BLOOD PRESSURE: 70 MMHG | RESPIRATION RATE: 18 BRPM | OXYGEN SATURATION: 99 % | TEMPERATURE: 98 F | SYSTOLIC BLOOD PRESSURE: 132 MMHG

## 2021-11-23 LAB
ANION GAP SERPL CALC-SCNC: 10 MMOL/L — SIGNIFICANT CHANGE UP (ref 5–17)
BASOPHILS # BLD AUTO: 0.05 K/UL — SIGNIFICANT CHANGE UP (ref 0–0.2)
BASOPHILS NFR BLD AUTO: 0.6 % — SIGNIFICANT CHANGE UP (ref 0–2)
BUN SERPL-MCNC: 17 MG/DL — SIGNIFICANT CHANGE UP (ref 7–23)
CALCIUM SERPL-MCNC: 9.3 MG/DL — SIGNIFICANT CHANGE UP (ref 8.4–10.5)
CHLORIDE SERPL-SCNC: 105 MMOL/L — SIGNIFICANT CHANGE UP (ref 96–108)
CO2 SERPL-SCNC: 25 MMOL/L — SIGNIFICANT CHANGE UP (ref 22–31)
CREAT SERPL-MCNC: 0.84 MG/DL — SIGNIFICANT CHANGE UP (ref 0.5–1.3)
CRP SERPL-MCNC: 10.2 MG/L — HIGH (ref 0–4)
EOSINOPHIL # BLD AUTO: 0.7 K/UL — HIGH (ref 0–0.5)
EOSINOPHIL NFR BLD AUTO: 9 % — HIGH (ref 0–6)
ERYTHROCYTE [SEDIMENTATION RATE] IN BLOOD: 55 MM/HR — HIGH
GLUCOSE SERPL-MCNC: 107 MG/DL — HIGH (ref 70–99)
HCT VFR BLD CALC: 33.9 % — LOW (ref 39–50)
HGB BLD-MCNC: 10.8 G/DL — LOW (ref 13–17)
IMM GRANULOCYTES NFR BLD AUTO: 1.4 % — SIGNIFICANT CHANGE UP (ref 0–1.5)
LYMPHOCYTES # BLD AUTO: 1.62 K/UL — SIGNIFICANT CHANGE UP (ref 1–3.3)
LYMPHOCYTES # BLD AUTO: 20.8 % — SIGNIFICANT CHANGE UP (ref 13–44)
MCHC RBC-ENTMCNC: 28.5 PG — SIGNIFICANT CHANGE UP (ref 27–34)
MCHC RBC-ENTMCNC: 31.9 GM/DL — LOW (ref 32–36)
MCV RBC AUTO: 89.4 FL — SIGNIFICANT CHANGE UP (ref 80–100)
MONOCYTES # BLD AUTO: 0.47 K/UL — SIGNIFICANT CHANGE UP (ref 0–0.9)
MONOCYTES NFR BLD AUTO: 6 % — SIGNIFICANT CHANGE UP (ref 2–14)
NEUTROPHILS # BLD AUTO: 4.85 K/UL — SIGNIFICANT CHANGE UP (ref 1.8–7.4)
NEUTROPHILS NFR BLD AUTO: 62.2 % — SIGNIFICANT CHANGE UP (ref 43–77)
NRBC # BLD: 0 /100 WBCS — SIGNIFICANT CHANGE UP (ref 0–0)
PLATELET # BLD AUTO: 385 K/UL — SIGNIFICANT CHANGE UP (ref 150–400)
POTASSIUM SERPL-MCNC: 4 MMOL/L — SIGNIFICANT CHANGE UP (ref 3.5–5.3)
POTASSIUM SERPL-SCNC: 4 MMOL/L — SIGNIFICANT CHANGE UP (ref 3.5–5.3)
RBC # BLD: 3.79 M/UL — LOW (ref 4.2–5.8)
RBC # FLD: 12.6 % — SIGNIFICANT CHANGE UP (ref 10.3–14.5)
SODIUM SERPL-SCNC: 140 MMOL/L — SIGNIFICANT CHANGE UP (ref 135–145)
WBC # BLD: 7.8 K/UL — SIGNIFICANT CHANGE UP (ref 3.8–10.5)
WBC # FLD AUTO: 7.8 K/UL — SIGNIFICANT CHANGE UP (ref 3.8–10.5)

## 2021-11-23 PROCEDURE — 87635 SARS-COV-2 COVID-19 AMP PRB: CPT

## 2021-11-23 PROCEDURE — 87075 CULTR BACTERIA EXCEPT BLOOD: CPT

## 2021-11-23 PROCEDURE — 80202 ASSAY OF VANCOMYCIN: CPT

## 2021-11-23 PROCEDURE — 88300 SURGICAL PATH GROSS: CPT

## 2021-11-23 PROCEDURE — 36415 COLL VENOUS BLD VENIPUNCTURE: CPT

## 2021-11-23 PROCEDURE — U0003: CPT

## 2021-11-23 PROCEDURE — 85027 COMPLETE CBC AUTOMATED: CPT

## 2021-11-23 PROCEDURE — 87206 SMEAR FLUORESCENT/ACID STAI: CPT

## 2021-11-23 PROCEDURE — 88304 TISSUE EXAM BY PATHOLOGIST: CPT

## 2021-11-23 PROCEDURE — 73723 MRI JOINT LWR EXTR W/O&W/DYE: CPT

## 2021-11-23 PROCEDURE — 86140 C-REACTIVE PROTEIN: CPT

## 2021-11-23 PROCEDURE — 86769 SARS-COV-2 COVID-19 ANTIBODY: CPT

## 2021-11-23 PROCEDURE — 76000 FLUOROSCOPY <1 HR PHYS/QHP: CPT

## 2021-11-23 PROCEDURE — 97535 SELF CARE MNGMENT TRAINING: CPT

## 2021-11-23 PROCEDURE — 86900 BLOOD TYPING SEROLOGIC ABO: CPT

## 2021-11-23 PROCEDURE — 80048 BASIC METABOLIC PNL TOTAL CA: CPT

## 2021-11-23 PROCEDURE — A9585: CPT

## 2021-11-23 PROCEDURE — 86901 BLOOD TYPING SEROLOGIC RH(D): CPT

## 2021-11-23 PROCEDURE — U0005: CPT

## 2021-11-23 PROCEDURE — 87102 FUNGUS ISOLATION CULTURE: CPT

## 2021-11-23 PROCEDURE — 97161 PT EVAL LOW COMPLEX 20 MIN: CPT

## 2021-11-23 PROCEDURE — 86850 RBC ANTIBODY SCREEN: CPT

## 2021-11-23 PROCEDURE — 71046 X-RAY EXAM CHEST 2 VIEWS: CPT

## 2021-11-23 PROCEDURE — 85730 THROMBOPLASTIN TIME PARTIAL: CPT

## 2021-11-23 PROCEDURE — 99285 EMERGENCY DEPT VISIT HI MDM: CPT | Mod: 25

## 2021-11-23 PROCEDURE — 85652 RBC SED RATE AUTOMATED: CPT

## 2021-11-23 PROCEDURE — 87070 CULTURE OTHR SPECIMN AEROBIC: CPT

## 2021-11-23 PROCEDURE — 85610 PROTHROMBIN TIME: CPT

## 2021-11-23 PROCEDURE — 87116 MYCOBACTERIA CULTURE: CPT

## 2021-11-23 PROCEDURE — 87186 SC STD MICRODIL/AGAR DIL: CPT

## 2021-11-23 PROCEDURE — 93005 ELECTROCARDIOGRAM TRACING: CPT

## 2021-11-23 PROCEDURE — 85025 COMPLETE CBC W/AUTO DIFF WBC: CPT

## 2021-11-23 PROCEDURE — 36573 INSJ PICC RS&I 5 YR+: CPT

## 2021-11-23 RX ORDER — ACETAMINOPHEN 500 MG
2 TABLET ORAL
Qty: 0 | Refills: 0 | DISCHARGE
Start: 2021-11-23

## 2021-11-23 RX ORDER — OXYCODONE HYDROCHLORIDE 5 MG/1
1 TABLET ORAL
Qty: 20 | Refills: 0
Start: 2021-11-23

## 2021-11-23 RX ORDER — BACITRACIN ZINC 500 UNIT/G
1 OINTMENT IN PACKET (EA) TOPICAL
Qty: 28.4 | Refills: 0
Start: 2021-11-23

## 2021-11-23 RX ORDER — ENOXAPARIN SODIUM 100 MG/ML
40 INJECTION SUBCUTANEOUS
Qty: 14 | Refills: 0
Start: 2021-11-23 | End: 2021-12-06

## 2021-11-23 RX ADMIN — Medication 100 MILLIGRAM(S): at 12:01

## 2021-11-23 RX ADMIN — Medication 1 APPLICATION(S): at 05:36

## 2021-11-23 RX ADMIN — PANTOPRAZOLE SODIUM 40 MILLIGRAM(S): 20 TABLET, DELAYED RELEASE ORAL at 06:15

## 2021-11-23 RX ADMIN — Medication 100 MILLIGRAM(S): at 05:36

## 2021-11-23 RX ADMIN — CHLORHEXIDINE GLUCONATE 1 APPLICATION(S): 213 SOLUTION TOPICAL at 05:36

## 2021-11-23 RX ADMIN — Medication 1 TABLET(S): at 11:59

## 2021-11-23 RX ADMIN — Medication 1 MILLIGRAM(S): at 11:59

## 2021-11-23 NOTE — PROGRESS NOTE ADULT - PROVIDER SPECIALTY LIST ADULT
Orthopedics
Infectious Disease
Orthopedics
Infectious Disease
Orthopedics
Infectious Disease

## 2021-11-23 NOTE — PROGRESS NOTE ADULT - SUBJECTIVE AND OBJECTIVE BOX
Ortho Note    Pt seen and examined on morning rounds. Pt comfortable without complaints, pain controlled. Dr. Ceballos to do dressing change and assess viability of flap and graft today.   Denies CP, SOB, N/V, numbness/tingling     Vital Signs Last 24 Hrs  T(C): 36.9 (11-23-21 @ 05:00), Max: 36.9 (11-23-21 @ 05:00)  T(F): 98.5 (11-23-21 @ 05:00), Max: 98.5 (11-23-21 @ 05:00)  HR: 74 (11-23-21 @ 05:00) (74 - 74)  BP: 111/75 (11-23-21 @ 05:00) (111/75 - 111/75)  BP(mean): 87 (11-23-21 @ 05:00) (87 - 87)  RR: 18 (11-23-21 @ 05:00) (18 - 18)  SpO2: 98% (11-23-21 @ 05:00) (98% - 98%)  I&O's Summary    21 Nov 2021 07:01  -  22 Nov 2021 07:00  --------------------------------------------------------  IN: 810 mL / OUT: 1840 mL / NET: -1030 mL    22 Nov 2021 07:01  -  23 Nov 2021 06:33  --------------------------------------------------------  IN: 1100 mL / OUT: 1105 mL / NET: -5 mL        Physical Exam:  General: Pt Alert and oriented, NAD  Dressing C/D/I: Tegaderm to the right thigh graft sight  CHRISTINE drain, prineo, telfa, abd, kerlix, ace  wound vac to anterior incision   Pulses: 2+ DP bilateral LE   Sensation: intact to light touch   Motor: EHL/FHL/TA/GS 5/5 RLE                           10.6   7.35  )-----------( 380      ( 22 Nov 2021 06:09 )             32.2     11-22    140  |  102  |  15  ----------------------------<  115<H>  4.0   |  27  |  0.90    Ca    9.2      22 Nov 2021 06:09        A/P: 21yMale RIGHT knee septic arthritis / leg osteomyelitis POD#5 s/p Right Knee I&D, Plastics closure with medial gastroc flap and right lateral thigh skin graft with Dr. Dasilva/Dr. Ceballos   - stable  - pain control  - continue bowel regimen  - DVT PPX: Enoxaparin  - IV antibiotics per ID   - Anticipate home with home Infusion Tuesday 11/23 pending progress  - plastics to assess viability of gastroc flap this morning morning before discharge  - Outpatient follow up with Dr. Ceballos Monday 11/29  - WBS: PREETI MAHAJAN in [facility]    - Dispo: home pending dressing change this AM    Jerry Serrato, PGY-1  Ortho Pager [phone] Ortho Note    Pt seen and examined on morning rounds. Pt comfortable without complaints, pain controlled. Dr. Ceballos to do dressing change and assess viability of flap and graft today.   Denies CP, SOB, N/V, numbness/tingling     Vital Signs Last 24 Hrs  T(C): 36.9 (11-23-21 @ 05:00), Max: 36.9 (11-23-21 @ 05:00)  T(F): 98.5 (11-23-21 @ 05:00), Max: 98.5 (11-23-21 @ 05:00)  HR: 74 (11-23-21 @ 05:00) (74 - 74)  BP: 111/75 (11-23-21 @ 05:00) (111/75 - 111/75)  BP(mean): 87 (11-23-21 @ 05:00) (87 - 87)  RR: 18 (11-23-21 @ 05:00) (18 - 18)  SpO2: 98% (11-23-21 @ 05:00) (98% - 98%)  I&O's Summary    21 Nov 2021 07:01  -  22 Nov 2021 07:00  --------------------------------------------------------  IN: 810 mL / OUT: 1840 mL / NET: -1030 mL    22 Nov 2021 07:01  -  23 Nov 2021 06:33  --------------------------------------------------------  IN: 1100 mL / OUT: 1105 mL / NET: -5 mL        Physical Exam:  General: Pt Alert and oriented, NAD  Dressing C/D/I: Tegaderm to the right thigh graft sight  CHRISTINE drain, prineo, telfa, abd, kerlix, ace  wound vac to anterior incision   Pulses: 2+ DP bilateral LE   Sensation: intact to light touch   Motor: EHL/FHL/TA/GS 5/5 RLE                           10.6   7.35  )-----------( 380      ( 22 Nov 2021 06:09 )             32.2     11-22    140  |  102  |  15  ----------------------------<  115<H>  4.0   |  27  |  0.90    Ca    9.2      22 Nov 2021 06:09        A/P: 21yMale RIGHT knee septic arthritis / leg osteomyelitis POD#5 s/p Right Knee I&D, Plastics closure with medial gastroc flap and right lateral thigh skin graft with Dr. Dasilva/Dr. Ceballos   - stable  - pain control  - continue bowel regimen  - DVT PPX: Enoxaparin for next two weeks  - IV antibiotics per ID   - Anticipate home with home Infusion Tuesday 11/23 pending progress  - plastics to assess viability of gastroc flap this morning morning before discharge  - Outpatient follow up with Dr. Ceballos Monday 11/29  - WBS: NWB RLE in  for two weeks post op (9 more days)  - Dispo: home pending dressing change this AM    Jerry Serrato, PGY-1  Ortho Pager [phone]

## 2021-11-23 NOTE — DISCHARGE NOTE NURSING/CASE MANAGEMENT/SOCIAL WORK - NSDPDISTO_GEN_ALL_CORE
[Contraception/ Emergency Contraception/ Safe Sexual Practices] : contraception, emergency contraception, safe sexual practices Home

## 2021-11-23 NOTE — DISCHARGE NOTE NURSING/CASE MANAGEMENT/SOCIAL WORK - PATIENT PORTAL LINK FT
You can access the FollowMyHealth Patient Portal offered by [facility] by registering at the following website: [email or web address]. By joining [facility]’s FollowMyHealth portal, you will also be able to view your health information using other applications (apps) compatible with our system.

## 2021-11-26 LAB — SURGICAL PATHOLOGY STUDY: SIGNIFICANT CHANGE UP

## 2021-12-02 ENCOUNTER — APPOINTMENT (OUTPATIENT)
Dept: ORTHOPEDIC SURGERY | Facility: CLINIC | Age: 21
End: 2021-12-02
Payer: COMMERCIAL

## 2021-12-02 PROCEDURE — 99024 POSTOP FOLLOW-UP VISIT: CPT

## 2021-12-02 NOTE — HISTORY OF PRESENT ILLNESS
[de-identified] : s/p RIGHT knee septic arthritis, ACL graft removal, tibia I and D with medial gastrocnemius flap coverage [de-identified] : Patient states currently is not having any pain other than occasional soreness in the calf with range of motion exercises.  He states he is maintaining a knee immobilizer at all times and is currently nonweightbearing per plastic surgery.  He did see plastic surgery 3 days ago and states her autograft is doing well with the recommendation begin physical therapy next week and may begin weightbearing as tolerated at that point.  He is currently on IV antibiotics and states blood was drawn 2 days ago but results are not available in the current system.  Patient is scheduled to see infectious disease doctor beginning of next week.   [de-identified] : On exam of the right leg, there is no swelling or effusion at the knee.  Knee immobilizer was removed.  The wounds are healed with this proximal skin graft thigh healing well.  There is normal sensation light touch throughout except decreased sensation in the saphenous nerve distribution consistent with the gastroc harvest.   [de-identified] : s/p RIGHT knee septic arthritis, ACL graft removal, tibia I and D with medial gastrocnemius flap coverage [de-identified] : Discussion at this time regarding her overall clinical progress and PT prescription given to begin next week.  I would like him to followup in 3 weeks and if any pain or concern is to contact the office

## 2021-12-06 ENCOUNTER — NON-APPOINTMENT (OUTPATIENT)
Age: 21
End: 2021-12-06

## 2021-12-07 ENCOUNTER — APPOINTMENT (OUTPATIENT)
Dept: INFECTIOUS DISEASE | Facility: CLINIC | Age: 21
End: 2021-12-07
Payer: COMMERCIAL

## 2021-12-07 VITALS
HEART RATE: 86 BPM | HEIGHT: 70 IN | SYSTOLIC BLOOD PRESSURE: 143 MMHG | WEIGHT: 155 LBS | TEMPERATURE: 97.3 F | BODY MASS INDEX: 22.19 KG/M2 | DIASTOLIC BLOOD PRESSURE: 76 MMHG | OXYGEN SATURATION: 99 %

## 2021-12-07 DIAGNOSIS — Z79.2 LONG TERM (CURRENT) USE OF ANTIBIOTICS: ICD-10-CM

## 2021-12-07 DIAGNOSIS — Z78.9 OTHER SPECIFIED HEALTH STATUS: ICD-10-CM

## 2021-12-07 PROCEDURE — 99213 OFFICE O/P EST LOW 20 MIN: CPT

## 2021-12-07 RX ORDER — CEPHALEXIN 500 MG/1
500 TABLET ORAL
Qty: 20 | Refills: 0 | Status: COMPLETED | COMMUNITY
Start: 2021-11-09 | End: 2021-12-07

## 2021-12-07 NOTE — DATA REVIEWED
[FreeTextEntry1] : 11/30/21 \par WBC 8.8\par ESR 18\par CRP 1\par \par MICROBIOLOGY:\par 11/16 R knee WCx #1: MSSA\par 11/16 R knee WCx #2: MSSA (S to cefaz, linezo, ox, rifam, bact, vanco)\par 11/12 R tibia fungal Cx:p\par 11/11 R tibia WCx: MSSA (S to cefaz, linezo, ox, rifam, bact, vanco)\par \par \par RADIOLOGY & ADDITIONAL STUDIES:\par MRI knee 11/13: \par Impression:\par Status post ACL reconstruction. Septic arthritis/osteomyelitis as above.\par Horizontal tear at the posterior horn of the lateral meniscus.\par

## 2021-12-07 NOTE — ASSESSMENT
[FreeTextEntry1] : 21M h/o R ACL reconstruction in 2014, revision in 2016 and 7/1/21 c/b MSSA infection s/p I&D x 2 in 7/2021 s/p cefazolin x 4 weeks (ended in mid 9/2021) c/b MSSA septic arthritis and OM of R knee s/p I&D on 11/11, 11/15, and medial gastrocnemius flap closure with skin graft harvest on 11/18 on IV cefazolin (11/18-12/29) p/w management of MSSA septic arthritis and OM of R knee.  \par \par Doing well, labs good.  Unclear cause of low grade temp, which is now resolved.  Will get BCx to make sure no CLABSI.  Since all foreign body removed, no indication for suppressive abx.  \par \par - cont cefazolin 2g IV q8h \par - Duration of antibiotics is 6 weeks from the closure ( 11/18 - 12/29 )\par - BCx and labs today \par - f/u Dr. Dasilva ortho \par - RT 3-4 weeks \par \par

## 2021-12-07 NOTE — HISTORY OF PRESENT ILLNESS
[FreeTextEntry1] : 21M h/o R ACL reconstruction in 2014, revision in 2016 and 7/1/21 c/b MSSA infection s/p I&D x 2 in 7/2021 s/p cefazolin x 4 weeks (ended in mid 9/2021) c/b MSSA septic arthritis and OM of R knee s/p I&D on 11/11, 11/15, and medial gastrocnemius flap closure with skin graft harvest on 11/18 on IV cefazolin (11/18-12/29) p/w management of MSSA septic arthritis and OM of R knee.  \par \par Patient was admitted from 11/11-11/23 for recurrent R knee infection.  He had h/o three surgeries at [facility].  After the 3rd surgery on 7/1/21, he developed R knee swelling/erythema and was found to have MSSA infection.  He underwent I&D twice with dissolvable screw placement  and received cefazolin 4 weeks.   His wound never healed.  He then presented to [facility] for septic arthritis of R knee, and was found to have OM as well.  He underwent I&D on 11/11 and 11/15, all foreign materials removed, and had medial gastrocnemius flap closure with skin graft harvest on 11/18.  OR culture all grew MSSA.  He was discharged home with 6 weeks of IV cefazolin (11/18-12/29). \par \par Patient reported 3 days of low grade temp (99.7-100.1) then resolved.  Currently he reports feeling well, denied fever/chills, n/v/d, abdominal pain.  Tolerating IV cefazolin without any issue.  His R knee pain resolved, swelling much improved, denied erythema or drainage.  \par \par

## 2021-12-07 NOTE — PHYSICAL EXAM
[General Appearance - Alert] : alert [General Appearance - In No Acute Distress] : in no acute distress [Sclera] : the sclera and conjunctiva were normal [Outer Ear] : the ears and nose were normal in appearance [Neck Appearance] : the appearance of the neck was normal [] : no respiratory distress [Auscultation Breath Sounds / Voice Sounds] : lungs were clear to auscultation bilaterally [Heart Rate And Rhythm] : heart rate was normal and rhythm regular [Heart Sounds] : normal S1 and S2 [Bowel Sounds] : normal bowel sounds [Abdomen Soft] : soft [FreeTextEntry1] : R knee with surgical wound well healed, mild swelling and warmth, no erythema, no ttp

## 2021-12-08 LAB
ALBUMIN SERPL ELPH-MCNC: 5.4 G/DL
ALP BLD-CCNC: 86 U/L
ALT SERPL-CCNC: 21 U/L
ANION GAP SERPL CALC-SCNC: 12 MMOL/L
AST SERPL-CCNC: 46 U/L
BASOPHILS # BLD AUTO: 0.05 K/UL
BASOPHILS NFR BLD AUTO: 1.1 %
BILIRUB SERPL-MCNC: 0.4 MG/DL
BUN SERPL-MCNC: 11 MG/DL
CALCIUM SERPL-MCNC: 10.4 MG/DL
CHLORIDE SERPL-SCNC: 103 MMOL/L
CO2 SERPL-SCNC: 27 MMOL/L
CREAT SERPL-MCNC: 0.92 MG/DL
CRP SERPL-MCNC: <3 MG/L
EOSINOPHIL # BLD AUTO: 0.43 K/UL
EOSINOPHIL NFR BLD AUTO: 9.7 %
ERYTHROCYTE [SEDIMENTATION RATE] IN BLOOD BY WESTERGREN METHOD: 6 MM/HR
GLUCOSE SERPL-MCNC: 86 MG/DL
HCT VFR BLD CALC: 47.2 %
HGB BLD-MCNC: 14.7 G/DL
IMM GRANULOCYTES NFR BLD AUTO: 0.2 %
LYMPHOCYTES # BLD AUTO: 1.11 K/UL
LYMPHOCYTES NFR BLD AUTO: 25 %
MAN DIFF?: NORMAL
MCHC RBC-ENTMCNC: 28.6 PG
MCHC RBC-ENTMCNC: 31.1 GM/DL
MCV RBC AUTO: 91.8 FL
MONOCYTES # BLD AUTO: 0.22 K/UL
MONOCYTES NFR BLD AUTO: 5 %
NEUTROPHILS # BLD AUTO: 2.62 K/UL
NEUTROPHILS NFR BLD AUTO: 59 %
PLATELET # BLD AUTO: 242 K/UL
POTASSIUM SERPL-SCNC: 4.2 MMOL/L
PROT SERPL-MCNC: 8.4 G/DL
RBC # BLD: 5.14 M/UL
RBC # FLD: 13.8 %
SODIUM SERPL-SCNC: 142 MMOL/L
WBC # FLD AUTO: 4.44 K/UL

## 2021-12-09 LAB
CULTURE RESULTS: NO GROWTH — SIGNIFICANT CHANGE UP
SPECIMEN SOURCE: SIGNIFICANT CHANGE UP

## 2021-12-11 LAB
CULTURE RESULTS: SIGNIFICANT CHANGE UP
SPECIMEN SOURCE: SIGNIFICANT CHANGE UP

## 2021-12-13 LAB
BACTERIA BLD CULT: NORMAL
BACTERIA BLD CULT: NORMAL

## 2021-12-14 ENCOUNTER — APPOINTMENT (OUTPATIENT)
Dept: ORTHOPEDIC SURGERY | Facility: CLINIC | Age: 21
End: 2021-12-14
Payer: COMMERCIAL

## 2021-12-14 PROCEDURE — 99024 POSTOP FOLLOW-UP VISIT: CPT

## 2021-12-14 NOTE — HISTORY OF PRESENT ILLNESS
[de-identified] : s/p RIGHT knee septic arthritis, ACL graft removal, tibia I and D with medial gastrocnemius flap coverage [de-identified] : Patient is presenting for further followup evaluations it eroded doing rather well with no significant pain at the knee.  He states he does have some improved discomfort in the calf and that it see plastics yesterday and has been cleared to increase activity.  Patient is continuing his IV antibiotics and is scheduled to have a patella health in one month as [place].  He started physical therapy and states she is doing well overall regards to this [de-identified] : On exam of the right leg,   There is no swelling at the knee currently.  Gastroc transfer looks well healed with skin covering.  There is moderate quad atrophy.  Range of motion of the knee is 0-90 with pain on flexion.  Range of motion of the ankle his plantar flexion 35 to dorsiflexion just past neutral.  Negative Jin [de-identified] : s/p RIGHT knee septic arthritis, ACL graft removal, tibia I and D with medial gastrocnemius flap coverage [de-identified] : Discussion at this time regarding her overall clinical progress.  Patient is to advance knee and ankle range of motion exercises.  He is to followup in one month and she will be back to [facility] around time  any increased pain or concern to contact the office.

## 2022-01-01 LAB
CULTURE RESULTS: SIGNIFICANT CHANGE UP
SPECIMEN SOURCE: SIGNIFICANT CHANGE UP

## 2022-01-13 ENCOUNTER — APPOINTMENT (OUTPATIENT)
Dept: INFECTIOUS DISEASE | Facility: CLINIC | Age: 22
End: 2022-01-13
Payer: COMMERCIAL

## 2022-01-13 VITALS
HEART RATE: 71 BPM | DIASTOLIC BLOOD PRESSURE: 75 MMHG | OXYGEN SATURATION: 97 % | WEIGHT: 161.25 LBS | BODY MASS INDEX: 23.08 KG/M2 | HEIGHT: 70 IN | SYSTOLIC BLOOD PRESSURE: 121 MMHG | TEMPERATURE: 97.4 F

## 2022-01-13 DIAGNOSIS — M00.9 PYOGENIC ARTHRITIS, UNSPECIFIED: ICD-10-CM

## 2022-01-13 DIAGNOSIS — A49.01 METHICILLIN SUSCEPTIBLE STAPHYLOCOCCUS AUREUS INFECTION, UNSPECIFIED SITE: ICD-10-CM

## 2022-01-13 PROCEDURE — 99213 OFFICE O/P EST LOW 20 MIN: CPT

## 2022-01-13 RX ORDER — CEFAZOLIN SODIUM 1 G/50ML
INJECTION, SOLUTION INTRAVENOUS
Refills: 0 | Status: COMPLETED | COMMUNITY
End: 2022-01-13

## 2022-01-13 NOTE — ASSESSMENT
[FreeTextEntry1] : 21M h/o R ACL reconstruction in 2014, revision in 2016 and 7/1/21 c/b MSSA infection s/p I&D x 2 in 7/2021 s/p cefazolin x 4 weeks (ended in mid 9/2021) c/b MSSA septic arthritis and OM of R knee s/p I&D on 11/11, 11/15, and medial gastrocnemius flap closure with skin graft harvest on 11/18 s/p IV cefazolin (11/18-12/29) p/w management of MSSA septic arthritis and OM of R knee.  \par \par Doing well.  No residual foreign material/hardware and thus no need for further abx.  Since he had two infections with MSSA on R knee, I explained to him that there may be a very small risk of recurrence.  Discussed about the signs of infection including increasing pain, erythema, drainage and I instructed him to contact me and Dr. Lua should he experience any of these symptoms.  Explained the importance of having intact skin and avoid scratching or dry skin.  \par \par - Monitor off abx\par - labs today for new baseline \par - f/u Dr. Dasilva ortho \par - RTC prn \par \par

## 2022-01-13 NOTE — HISTORY OF PRESENT ILLNESS
[FreeTextEntry1] : 21M h/o R ACL reconstruction in 2014, revision in 2016 and 7/1/21 c/b MSSA infection s/p I&D x 2 in 7/2021 s/p cefazolin x 4 weeks (ended in mid 9/2021) c/b MSSA septic arthritis and OM of R knee s/p I&D on 11/11, 11/15, and medial gastrocnemius flap closure with skin graft harvest on 11/18 s/p IV cefazolin (11/18-12/29) p/w management of MSSA septic arthritis and OM of R knee.  \par \par Patient was admitted from 11/11-11/23 for recurrent R knee infection.  He had h/o three surgeries at [facility].  After the 3rd surgery on 7/1/21, he developed R knee swelling/erythema and was found to have MSSA infection.  He underwent I&D twice with dissolvable screw placement  and received cefazolin 4 weeks.   His wound never healed.  He then presented to [facility] for septic arthritis of R knee, and was found to have OM as well.  He underwent I&D on 11/11 and 11/15, all foreign materials removed, and had medial gastrocnemius flap closure with skin graft harvest on 11/18.  OR culture all grew MSSA.  He was discharged home with 6 weeks of IV cefazolin (11/18-12/29).   Since discharge, he tolerated IV cefazolin well and completed the course on 12/29 as scheduled.\par \par Today patient came here for follow up.  He reports doing well, off abx for >2 weeks now.  Denied any pain at R knee, no warmth, drainage, erythema.  Increasing exercise, and he was told that he can start jogging soon.  Denied fever/chills, n/v/d, abdominal pain. \par \par

## 2022-01-13 NOTE — DATA REVIEWED
[FreeTextEntry1] : MICROBIOLOGY:\par 11/16 R knee WCx #1: MSSA\par 11/16 R knee WCx #2: MSSA (S to cefaz, linezo, ox, rifam, bact, vanco)\par 11/12 R tibia fungal Cx:p\par 11/11 R tibia WCx: MSSA (S to cefaz, linezo, ox, rifam, bact, vanco)\par \par \par RADIOLOGY & ADDITIONAL STUDIES:\par MRI knee 11/13: \par Impression:\par Status post ACL reconstruction. Septic arthritis/osteomyelitis as above.\par Horizontal tear at the posterior horn of the lateral meniscus.\par

## 2022-01-13 NOTE — PHYSICAL EXAM
[General Appearance - Alert] : alert [General Appearance - In No Acute Distress] : in no acute distress [Sclera] : the sclera and conjunctiva were normal [Outer Ear] : the ears and nose were normal in appearance [Neck Appearance] : the appearance of the neck was normal [] : no respiratory distress [Auscultation Breath Sounds / Voice Sounds] : lungs were clear to auscultation bilaterally [Heart Rate And Rhythm] : heart rate was normal and rhythm regular [Heart Sounds] : normal S1 and S2 [Bowel Sounds] : normal bowel sounds [Abdomen Soft] : soft [FreeTextEntry1] : R knee with surgical wound well healed, flap well healed, no warmth/swelling/drainage, no ttp

## 2022-01-14 LAB
ANION GAP SERPL CALC-SCNC: 13 MMOL/L
BASOPHILS # BLD AUTO: 0.03 K/UL
BASOPHILS NFR BLD AUTO: 0.6 %
BUN SERPL-MCNC: 13 MG/DL
CALCIUM SERPL-MCNC: 10.2 MG/DL
CHLORIDE SERPL-SCNC: 103 MMOL/L
CO2 SERPL-SCNC: 25 MMOL/L
CREAT SERPL-MCNC: 1.01 MG/DL
CRP SERPL-MCNC: <3 MG/L
EOSINOPHIL # BLD AUTO: 0.17 K/UL
EOSINOPHIL NFR BLD AUTO: 3.5 %
ERYTHROCYTE [SEDIMENTATION RATE] IN BLOOD BY WESTERGREN METHOD: 3 MM/HR
GLUCOSE SERPL-MCNC: 99 MG/DL
HCT VFR BLD CALC: 47.2 %
HGB BLD-MCNC: 14.7 G/DL
IMM GRANULOCYTES NFR BLD AUTO: 0.8 %
LYMPHOCYTES # BLD AUTO: 1.37 K/UL
LYMPHOCYTES NFR BLD AUTO: 27.9 %
MAN DIFF?: NORMAL
MCHC RBC-ENTMCNC: 28.5 PG
MCHC RBC-ENTMCNC: 31.1 GM/DL
MCV RBC AUTO: 91.5 FL
MONOCYTES # BLD AUTO: 0.35 K/UL
MONOCYTES NFR BLD AUTO: 7.1 %
NEUTROPHILS # BLD AUTO: 2.95 K/UL
NEUTROPHILS NFR BLD AUTO: 60.1 %
PLATELET # BLD AUTO: 278 K/UL
POTASSIUM SERPL-SCNC: 5.2 MMOL/L
RBC # BLD: 5.16 M/UL
RBC # FLD: 12.9 %
SODIUM SERPL-SCNC: 140 MMOL/L
WBC # FLD AUTO: 4.91 K/UL

## 2022-01-19 ENCOUNTER — APPOINTMENT (OUTPATIENT)
Dept: ORTHOPEDIC SURGERY | Facility: CLINIC | Age: 22
End: 2022-01-19
Payer: COMMERCIAL

## 2022-01-19 DIAGNOSIS — S83.519A SPRAIN OF ANTERIOR CRUCIATE LIGAMENT OF UNSPECIFIED KNEE, INITIAL ENCOUNTER: ICD-10-CM

## 2022-01-19 DIAGNOSIS — M86.9 OSTEOMYELITIS, UNSPECIFIED: ICD-10-CM

## 2022-01-19 PROCEDURE — 99024 POSTOP FOLLOW-UP VISIT: CPT

## 2022-01-19 NOTE — HISTORY OF PRESENT ILLNESS
[de-identified] : s/p RIGHT knee septic arthritis, ACL graft removal, tibia I and D with medial gastrocnemius flap coverage [de-identified] : \par She is an established patient presented for postop evaluation sits or is doing rather well.  He states he is been cleared by ID based on labs and denies any current pain or swelling. [de-identified] : On exam of the right leg,   There is no swelling at the knee currently.  Gastroc transfer looks well healed with skin covering.  There is mild quad atrophy.  Range of motion of the knee is 0-140  Range of motion of the ankle his plantar flexion 35 to dorsiflexion just past neutral.  Negative Jin.  + Lachman [de-identified] : s/p RIGHT knee septic arthritis, ACL graft removal, tibia I and D with medial gastrocnemius flap coverage [de-identified] : Discussion at this time regarding her overall clinical progress.  Patient is to advance knee and ankle range of motion exercises.  He is to followup as needed and consideration to reconstruction but I would wait for at least one year. Discussed activity modifications as well as ACL functional bracing- patient would like to have functional brace.

## 2023-01-05 ENCOUNTER — APPOINTMENT (OUTPATIENT)
Dept: ORTHOPEDIC SURGERY | Facility: CLINIC | Age: 23
End: 2023-01-05